# Patient Record
Sex: MALE | Race: WHITE | NOT HISPANIC OR LATINO | Employment: FULL TIME | URBAN - METROPOLITAN AREA
[De-identification: names, ages, dates, MRNs, and addresses within clinical notes are randomized per-mention and may not be internally consistent; named-entity substitution may affect disease eponyms.]

---

## 2024-07-02 ENCOUNTER — EVALUATION (OUTPATIENT)
Dept: PHYSICAL THERAPY | Facility: CLINIC | Age: 24
End: 2024-07-02
Payer: COMMERCIAL

## 2024-07-02 DIAGNOSIS — M25.511 CHRONIC RIGHT SHOULDER PAIN: Primary | ICD-10-CM

## 2024-07-02 DIAGNOSIS — M75.41 IMPINGEMENT SYNDROME OF RIGHT SHOULDER: ICD-10-CM

## 2024-07-02 DIAGNOSIS — G89.29 CHRONIC RIGHT SHOULDER PAIN: Primary | ICD-10-CM

## 2024-07-02 PROCEDURE — 97161 PT EVAL LOW COMPLEX 20 MIN: CPT

## 2024-07-08 ENCOUNTER — OFFICE VISIT (OUTPATIENT)
Dept: PHYSICAL THERAPY | Facility: CLINIC | Age: 24
End: 2024-07-08
Payer: COMMERCIAL

## 2024-07-08 DIAGNOSIS — G89.29 CHRONIC RIGHT SHOULDER PAIN: Primary | ICD-10-CM

## 2024-07-08 DIAGNOSIS — M25.511 CHRONIC RIGHT SHOULDER PAIN: Primary | ICD-10-CM

## 2024-07-08 DIAGNOSIS — M75.41 IMPINGEMENT SYNDROME OF RIGHT SHOULDER: ICD-10-CM

## 2024-07-08 PROCEDURE — 97112 NEUROMUSCULAR REEDUCATION: CPT | Performed by: PHYSICAL THERAPIST

## 2024-07-08 PROCEDURE — 97110 THERAPEUTIC EXERCISES: CPT | Performed by: PHYSICAL THERAPIST

## 2024-07-08 NOTE — PROGRESS NOTES
"Daily Note     Today's date: 2024  Patient name: Rolf Heard  : 2000  MRN: [id number]  Referring provider: Shira Evans CR*  Dx:   Encounter Diagnosis     ICD-10-CM    1. Chronic right shoulder pain  M25.511     G89.29       2. Impingement syndrome of right shoulder  M75.41           Start Time: 1755  Stop Time: 1836  Total time in clinic (min): 41 minutes    Subjective: Client arrives with reports on trying to get his HEP done a couple times over the holiday weekend. He also feels a \"pulling\" at times anterior to R shoulder.       Objective: See treatment diary below      Assessment: Client with good participation throughout session. He requires min cues for reducing compensatory patterns with upper trap and torso rotation during stabilizing exercises. Good carryover throughout. HEP updated and provided with client. All questions addressed at this time.       Plan: Continue per plan of care.       Insurance:  A/CMS Eval/ Re-eval Auth #/ Referral # Total units or visits Start date  Expiration date KX? Visit limitation?  PT only or  PT+OT? Co-Insurance   CMS 24 [id number]  7/2/24 10/2/24                     POC Start Date POC Expiration Date Signed POC?   24 pending     #[id number] Date              Visits/Units: 12 Used 1 1             Authed: 2024-10/2/2024  Remaining  11 10                  Precautions: None  HEP: Access Code [email or web address]    Date       Visit Number    2 1 (IE)   Manuals                                        Neuro Re-Ed         Scap retr    2x10    Supine horiz abd    RTB 2x10    Supine GH abc    2x    Seated no money    RTB 2x15    Supine diagonals    RTB 1x15 ea    I, T, Y    10x each (+) cues    Serratus wall slide    Complete next session                    Ther Ex        Sleeper str    10x, 10 sec hold    Doorway pec str    3x30 sec                                                    Ther Activity                        Gait Training           "              Modalities

## 2024-07-17 ENCOUNTER — OFFICE VISIT (OUTPATIENT)
Dept: PHYSICAL THERAPY | Facility: CLINIC | Age: 24
End: 2024-07-17
Payer: COMMERCIAL

## 2024-07-17 DIAGNOSIS — G89.29 CHRONIC RIGHT SHOULDER PAIN: Primary | ICD-10-CM

## 2024-07-17 DIAGNOSIS — M75.41 IMPINGEMENT SYNDROME OF RIGHT SHOULDER: ICD-10-CM

## 2024-07-17 DIAGNOSIS — M25.511 CHRONIC RIGHT SHOULDER PAIN: Primary | ICD-10-CM

## 2024-07-17 PROCEDURE — 97112 NEUROMUSCULAR REEDUCATION: CPT | Performed by: PHYSICAL THERAPIST

## 2024-07-17 NOTE — PROGRESS NOTES
Daily Note     Today's date: 2024  Patient name: Rolf Heard  : 2000  MRN: [id number]  Referring provider: Shira Evans CR*  Dx:   Encounter Diagnosis     ICD-10-CM    1. Chronic right shoulder pain  M25.511     G89.29       2. Impingement syndrome of right shoulder  M75.41           Start Time: 1749  Stop Time: 1840  Total time in clinic (min): 51 minutes    Subjective: Client arrives with reports on having some low back pain at times.       Objective: See treatment diary below      Assessment: Treatment focus on stabilization and strengthening. He continues to require min cues for appropriate alignment and cues. Reduction of low back pain during session. Continue to reinforce form. Initiated and trained on scapular clocks with yellow tband.       Plan: Continue per plan of care.       Insurance:  A/CMS Eval/ Re-eval Auth #/ Referral # Total units or visits Start date  Expiration date KX? Visit limitation?  PT only or  PT+OT? Co-Insurance   CMS 24 [id number]  7/2/24 10/2/24                     POC Start Date POC Expiration Date Signed POC?   24 pending     #[id number] Date             Visits/Units: 12 Used 1 1 1            Authed: 2024-10/2/2024  Remaining  11 10 9                 Precautions: None  HEP: Access Code [email or web address]    Date      Visit Number   3 2 1 (IE)   Manuals                                        Neuro Re-Ed         Scap retr   2x10 3-5 sec 2x10    Supine horiz abd   RTB 2x10 RTB 2x10    Supine GH abc   At 90 x 2 and 110 x 2 2x    Seated no money   Supine RTB 2x15 RTB 2x15    Supine diagonals   RTB 2x10 bilaterally RTB 1x15 ea    I, T, Y   10x each (+) cues 10x each (+) cues    Serratus wall slide   Serratus clock YTB 20x Complete next session                    Ther Ex        Sleeper str    10x, 10 sec hold    Doorway pec str   3x30 sec 3x30 sec                                                    Ther Activity                         Gait Training                        Modalities

## 2024-07-23 ENCOUNTER — OFFICE VISIT (OUTPATIENT)
Dept: PHYSICAL THERAPY | Facility: CLINIC | Age: 24
End: 2024-07-23
Payer: COMMERCIAL

## 2024-07-23 DIAGNOSIS — G89.29 CHRONIC RIGHT SHOULDER PAIN: Primary | ICD-10-CM

## 2024-07-23 DIAGNOSIS — M25.511 CHRONIC RIGHT SHOULDER PAIN: Primary | ICD-10-CM

## 2024-07-23 DIAGNOSIS — M75.41 IMPINGEMENT SYNDROME OF RIGHT SHOULDER: ICD-10-CM

## 2024-07-23 PROCEDURE — 97112 NEUROMUSCULAR REEDUCATION: CPT

## 2024-07-23 NOTE — PROGRESS NOTES
Daily Note     Today's date: 2024  Patient name: Rolf Heard  : 2000  MRN: [id number]  Referring provider: Shira Evans CR*  Dx:   Encounter Diagnosis     ICD-10-CM    1. Chronic right shoulder pain  M25.511     G89.29       2. Impingement syndrome of right shoulder  M75.41           Start Time: 1750  Stop Time: 1836  Total time in clinic (min): 46 minutes    Subjective: Pt reports that his shoulder feels about the same overall so far. Pt had an increase in R shoulder pain after doing a chest workout, currently still feels that discomfort in his anterior R shoulder.       Objective: See treatment diary below      Assessment: Tolerated treatment well. Patient demonstrated fatigue post treatment, exhibited good technique with therapeutic exercises, and would benefit from continued PT. Pt demonstrates less lumbar extension compensations with his AROM exercises, however he continues to get anterior R shoulder pain around his biceps tendon with any overhead work. Introduced more proximal biceps stretches with cane extension and BTB IR stretch with a towel. Continued to emphasize RTC engagement and good posture to help eliminate biceps recruitment and possible impingement. HEP was updated in response to his changes today.       Plan: Continue per plan of care.  Progress treatment as tolerated.        Insurance:  AMA/CMS Eval/ Re-eval Auth #/ Referral # Total units or visits Start date  Expiration date KX? Visit limitation?  PT only or  PT+OT? Co-Insurance   CMS 24 [id number]  7/2/24 10/2/24                     POC Start Date POC Expiration Date Signed POC?   24 pending     #[id number] Date            Visits/Units: 12 Used 1 2 3 4           Authed: 2024-10/2/2024  Remaining  11 10 9 8                Precautions: None  HEP: Access Code [email or web address]    Date     Visit Number  4 3 2 1 (IE)   Manuals                                        Neuro Re-Ed          Scap retr  Rows & extensions black 2x10 ea 2x10 3-5 sec 2x10    Supine horiz abd   RTB 2x10 RTB 2x10    Supine GH abc  Standing YTB 1x ea direction At 90 x 2 and 110 x 2 2x    Seated no money   Supine RTB 2x15 RTB 2x15    Supine diagonals   RTB 2x10 bilaterally RTB 1x15 ea    I, T, Y   10x each (+) cues 10x each (+) cues    Serratus wall slide  Serratus flexion YTB 1x15 Serratus clock YTB 20x Complete next session    Wall walks  YTB lateral 3 laps              Ther Ex        Sleeper str    10x, 10 sec hold    Doorway pec str   3x30 sec 3x30 sec    Standing cane ext PROM  Biceps str 2x10, 5s hold      IR BTB str w/towel  2x10, 5s hold                                      Ther Activity                        Gait Training                        Modalities

## 2024-08-01 ENCOUNTER — APPOINTMENT (OUTPATIENT)
Dept: PHYSICAL THERAPY | Facility: CLINIC | Age: 24
End: 2024-08-01
Payer: COMMERCIAL

## 2024-08-06 ENCOUNTER — OFFICE VISIT (OUTPATIENT)
Dept: PHYSICAL THERAPY | Facility: CLINIC | Age: 24
End: 2024-08-06
Payer: COMMERCIAL

## 2024-08-06 DIAGNOSIS — M75.41 IMPINGEMENT SYNDROME OF RIGHT SHOULDER: ICD-10-CM

## 2024-08-06 DIAGNOSIS — G89.29 CHRONIC RIGHT SHOULDER PAIN: Primary | ICD-10-CM

## 2024-08-06 DIAGNOSIS — M25.511 CHRONIC RIGHT SHOULDER PAIN: Primary | ICD-10-CM

## 2024-08-06 PROCEDURE — 97112 NEUROMUSCULAR REEDUCATION: CPT

## 2024-08-06 NOTE — PROGRESS NOTES
Daily Note     Today's date: 2024  Patient name: Rolf Heard  : 2000  MRN: [id number]  Referring provider: Shira Evans CR*  Dx:   Encounter Diagnosis     ICD-10-CM    1. Chronic right shoulder pain  M25.511     G89.29       2. Impingement syndrome of right shoulder  M75.41           Start Time: 1750  Stop Time: 1830  Total time in clinic (min): 40 minutes    Subjective: Pt reports no significant changes since his last treatment session. Pt feels potentially slightly decreased pain with OH movements, however still feels a pinching sensation. Pt denies pain at rest, no new complaints.       Objective: See treatment diary below      Assessment: Tolerated treatment well. Patient demonstrated fatigue post treatment, exhibited good technique with therapeutic exercises, and would benefit from continued PT      Plan: Continue per plan of care.  Progress treatment as tolerated.        Insurance:  [place]/CMS Eval/ Re-eval Auth #/ Referral # Total units or visits Start date  Expiration date KX? Visit limitation?  PT only or  PT+OT? Co-Insurance   CMS 24 [id number]  7/2/24 10/2/24                     POC Start Date POC Expiration Date Signed POC?   24 pending     #[id number] Date           Visits/Units: 12 Used 1 2 3 4 5          Authed: 2024-10/2/2024  Remaining  11 10 9 8 7               Precautions: None  HEP: Access Code [email or web address]    Date    Visit Number 5 4 3 2 1 (IE)   Manuals                                        Neuro Re-Ed         Scap retr  Rows & extensions black 2x10 ea 2x10 3-5 sec 2x10    Supine horiz abd   RTB 2x10 RTB 2x10    Supine GH abc  Standing YTB 1x ea direction At 90 x 2 and 110 x 2 2x    Seated no money   Supine RTB 2x15 RTB 2x15    Supine diagonals   RTB 2x10 bilaterally RTB 1x15 ea    I, T, Y   10x each (+) cues 10x each (+) cues    bodyblade Neutral 2x30s  45 ER 1x30s  90 flex 1x30s       Serratus wall slide Serratus  wobbles 2x10 Serratus flexion YTB 1x15 Serratus clock YTB 20x Complete next session    Wall walks YTB lateral 3 laps    YTB vertical 10 laps YTB lateral 3 laps              Ther Ex        Sleeper str    10x, 10 sec hold    Doorway pec str   3x30 sec 3x30 sec    Standing cane ext PROM Biceps str 3x10, 5s hold Biceps str 2x10, 5s hold      IR BTB str w/towel 2x10, 5s hold 2x10, 5s hold                                      Ther Activity                        Gait Training                        Modalities

## 2024-08-13 ENCOUNTER — EVALUATION (OUTPATIENT)
Dept: PHYSICAL THERAPY | Facility: CLINIC | Age: 24
End: 2024-08-13
Payer: COMMERCIAL

## 2024-08-13 DIAGNOSIS — M25.511 CHRONIC RIGHT SHOULDER PAIN: Primary | ICD-10-CM

## 2024-08-13 DIAGNOSIS — G89.29 CHRONIC RIGHT SHOULDER PAIN: Primary | ICD-10-CM

## 2024-08-13 DIAGNOSIS — M75.41 IMPINGEMENT SYNDROME OF RIGHT SHOULDER: ICD-10-CM

## 2024-08-13 PROCEDURE — 97110 THERAPEUTIC EXERCISES: CPT

## 2024-08-13 NOTE — LETTER
2024    VINICIUS Jc  [address] [facility] [address]    Patient: Rolf Heard   YOB: 2000   Date of Visit: 2024     Encounter Diagnosis     ICD-10-CM    1. Chronic right shoulder pain  M25.511     G89.29       2. Impingement syndrome of right shoulder  M75.41           Dear Dr. Evans:    Thank you for your recent referral of Rolf Heard. Please review the attached evaluation summary from Rolf's recent visit.     Please verify that you agree with the plan of care by signing the attached order.     If you have any questions or concerns, please do not hesitate to call.     I sincerely appreciate the opportunity to share in the care of one of your patients and hope to have another opportunity to work with you in the near future.       Sincerely,    Slim Cohn, PT      Referring Provider:      I certify that I have read the below Plan of Care and certify the need for these services furnished under this plan of treatment while under my care.                    VINICIUS Jc  [address] [place] [address]  Via Fax: [phone]          PT Re-Evaluation     Today's date: 2024  Patient name: Rolf Heard  : 2000  MRN: [id number]  Referring provider: Shira Evans CR*  Dx:   Encounter Diagnosis     ICD-10-CM    1. Chronic right shoulder pain  M25.511     G89.29       2. Impingement syndrome of right shoulder  M75.41             Start Time: 1752  Stop Time: 1830  Total time in clinic (min): 38 minutes    Assessment  Impairments: abnormal or restricted ROM, abnormal movement, activity intolerance, pain with function, poor body mechanics, participation limitations and activity limitations  Symptom irritability: low    Assessment details: Pt is a 24 y.o male who presents to skilled physical therapy for his 6th visit with complaints of chronic R shoulder pain. Pt demonstrated fair sitting posture, slightly increased R shoulder A/PROM with  less pain, slightly increased shoulder strength, continued TTP along proximal biceps and bicipital groove, and slightly improved scapular mechanics. Pt has made slow but steady gains over the past several weeks with the above listed deficits that have enabled him to return to more ADLs with decreased pain, however he has not returned to all ADL's and recreational activities without pain or compensations. Pt was re-educated on his diagnosis, prognosis, POC, and HEP. Pt would continue to benefit from skilled physical therapy to reduce his pain, address his deficits, progress towards his goals, and return to his PLOF.    Understanding of Dx/Px/POC: good     Prognosis: good    Goals  Short Term Goals:  1) Pt will initiate and progress his HEP in 2-3 weeks.- Met  2) Pt will improve his shoulder AROM to WNL without pain or compensations in 2-3 weeks.- Met  3) Pt will improve his shoulder MMT by 1 to improve his ADLs in 2-3 weeks.- Met    Long Term Goals:  1) Pt will be able to reach overhead without pain to grab a plate in 4-6 weeks.- Met  2) Pt will be able to perform ADLs without shoulder pain in 4-6 weeks.- Progressing  3) Pt will be able to workout for at least 30 mins with less than 2/10 pain in 4-6 weeks. -Progressing    Plan  Patient would benefit from: skilled physical therapy and PT eval  Planned modality interventions: cryotherapy    Planned therapy interventions: activity modification, ADL retraining, joint mobilization, manual therapy, motor coordination training, body mechanics training, neuromuscular re-education, coordination, postural training, patient/caregiver education, strengthening, stretching, therapeutic activities, therapeutic exercise, flexibility, functional ROM exercises, graded exercise and home exercise program    Frequency: 2x week  Duration in weeks: 6  Plan of Care beginning date: 8/13/2024  Plan of Care expiration date: 9/24/2024  Treatment plan discussed with: patient        Subjective  Evaluation    History of Present Illness  Mechanism of injury: 24 Update:  Pt states that he may be getting a little better. He has been taking it lighter at the gym which may have helped. Pt doesn't feel it as much with the dead hangs he would do. He still gets pain in the front of the shoulder when he does some of the PT exercises or when he does push-ups/chest presses.  He gets pain less frequently with the push-ups, he also isn't getting the intense pain the next morning when he is reaching overhead. Sleeping on his side he still might get a twinge but otherwise feels good. Pt doesn't have any follow-ups with his referring provider at this time. Pt feels that he is around 85% improved.   Patient Goals  Patient goals for therapy: decreased pain, increased motion, independence with ADLs/IADLs, return to sport/leisure activities and increased strength  Patient goal: working out without pain  Pain  Current pain ratin  At best pain ratin  At worst pain ratin  Location: anterior shoulder, biceps tendon/groove  Quality: sharp and pulling  Alleviating factors: stretches, exercises.  Aggravating factors: overhead activity and lifting (chest press)  Progression: improved    Social Support  Lives with: alone    Employment status: working  Hand dominance: right  Exercise history: working out at the gym          Objective     Static Posture     Head  Forward.    Shoulders  Rounded.    Postural Observations  Seated posture: fair  Standing posture: fair      Tenderness     Right Shoulder  Tenderness in the biceps tendon (proximal) and bicipital groove. No tenderness in the AC joint and acromion.     Active Range of Motion   Left Shoulder   Flexion: 165 (pulling) degrees   Abduction: 175 degrees   External rotation BTH: T4   Internal rotation BTB: T6     Right Shoulder   Flexion: 165 (pulling) degrees   Abduction: 170 degrees   External rotation BTH: T4 (squeeze)   Internal rotation BTB: T6 (discomfort)      Passive Range of Motion     Right Shoulder   Flexion: WFL  Abduction: WFL  External rotation 90°: WFL  Internal rotation 90°: 60 degrees with pain    Strength/Myotome Testing     Left Shoulder     Planes of Motion   Flexion: 5   Abduction: 4+   External rotation at 0°: 4+   Internal rotation at 0°: 4+     Right Shoulder     Planes of Motion   Flexion: 5   Abduction: 4+   External rotation at 0°: 4+   Internal rotation at 0°: 4+     Left Elbow   Flexion: 5  Extension: 5    Right Elbow   Flexion: 5  Extension: 5    Tests     Right Shoulder   Positive Hawkin's and lift-off.   Negative empty can, full can, horn blower, painful arc, Speed's and bicep load test positive.                   Insurance:  AMA/CMS Eval/ Re-eval Auth #/ Referral # Total units or visits Start date  Expiration date KX? Visit limitation?  PT only or  PT+OT? Co-Insurance   CMS 7/2/24 [id number]  7/2/24 10/2/24       CMS 8/13/24             POC Start Date POC Expiration Date Signed POC?   7/2/24 8/13/24 pending   8/13/24 9/24/24 pending     #[id number] Date 7/2 7/8 7/17 7/23 8/6 8/13         Visits/Units: 12 Used 1 2 3 4 5 6         Authed: 7/2/2024-10/2/2024  Remaining  11 10 9 8 7 6           Precautions: None  HEP: Access Code [email or web address]    Date 8/13 8/6 7/23 7/17 7/8   Visit Number 6 5 4 3 2   Manuals                                        Neuro Re-Ed         Scap retr   Rows & extensions black 2x10 ea 2x10 3-5 sec 2x10   Supine horiz abd    RTB 2x10 RTB 2x10   Supine GH abc   Standing YTB 1x ea direction At 90 x 2 and 110 x 2 2x   Seated no money    Supine RTB 2x15 RTB 2x15   Supine diagonals    RTB 2x10 bilaterally RTB 1x15 ea   I, T, Y    10x each (+) cues 10x each (+) cues   bodyblade  Neutral 2x30s  45 ER 1x30s  90 flex 1x30s      Serratus wall slide  Serratus wobbles 2x10 Serratus flexion YTB 1x15 Serratus clock YTB 20x Complete next session   Wall walks  YTB lateral 3 laps    YTB vertical 10 laps YTB lateral 3 laps             Ther Ex         Sleeper str     10x, 10 sec hold   Doorway pec str    3x30 sec 3x30 sec   Standing cane ext PROM  Biceps str 3x10, 5s hold Biceps str 2x10, 5s hold     IR BTB str w/towel  2x10, 5s hold 2x10, 5s hold                                     Ther Activity                        Gait Training                        Modalities

## 2024-08-13 NOTE — PROGRESS NOTES
PT Re-Evaluation     Today's date: 2024  Patient name: Rolf Heard  : 2000  MRN: [id number]  Referring provider: Shira Evans CR*  Dx:   Encounter Diagnosis     ICD-10-CM    1. Chronic right shoulder pain  M25.511     G89.29       2. Impingement syndrome of right shoulder  M75.41             Start Time: 1752  Stop Time: 1830  Total time in clinic (min): 38 minutes    Assessment  Impairments: abnormal or restricted ROM, abnormal movement, activity intolerance, pain with function, poor body mechanics, participation limitations and activity limitations  Symptom irritability: low    Assessment details: Pt is a 24 y.o male who presents to skilled physical therapy for his 6th visit with complaints of chronic R shoulder pain. Pt demonstrated fair sitting posture, slightly increased R shoulder A/PROM with less pain, slightly increased shoulder strength, continued TTP along proximal biceps and bicipital groove, and slightly improved scapular mechanics. Pt has made slow but steady gains over the past several weeks with the above listed deficits that have enabled him to return to more ADLs with decreased pain, however he has not returned to all ADL's and recreational activities without pain or compensations. Pt was re-educated on his diagnosis, prognosis, POC, and HEP. Pt would continue to benefit from skilled physical therapy to reduce his pain, address his deficits, progress towards his goals, and return to his PLOF.    Understanding of Dx/Px/POC: good     Prognosis: good    Goals  Short Term Goals:  1) Pt will initiate and progress his HEP in 2-3 weeks.- Met  2) Pt will improve his shoulder AROM to WNL without pain or compensations in 2-3 weeks.- Met  3) Pt will improve his shoulder MMT by 1 to improve his ADLs in 2-3 weeks.- Met    Long Term Goals:  1) Pt will be able to reach overhead without pain to grab a plate in 4-6 weeks.- Met  2) Pt will be able to perform ADLs without shoulder pain in 4-6  weeks.- Progressing  3) Pt will be able to workout for at least 30 mins with less than 2/10 pain in 4-6 weeks. -Progressing    Plan  Patient would benefit from: skilled physical therapy and PT eval  Planned modality interventions: cryotherapy    Planned therapy interventions: activity modification, ADL retraining, joint mobilization, manual therapy, motor coordination training, body mechanics training, neuromuscular re-education, coordination, postural training, patient/caregiver education, strengthening, stretching, therapeutic activities, therapeutic exercise, flexibility, functional ROM exercises, graded exercise and home exercise program    Frequency: 2x week  Duration in weeks: 6  Plan of Care beginning date: 2024  Plan of Care expiration date: 2024  Treatment plan discussed with: patient        Subjective Evaluation    History of Present Illness  Mechanism of injury: 24 Update:  Pt states that he may be getting a little better. He has been taking it lighter at the gym which may have helped. Pt doesn't feel it as much with the dead hangs he would do. He still gets pain in the front of the shoulder when he does some of the PT exercises or when he does push-ups/chest presses.  He gets pain less frequently with the push-ups, he also isn't getting the intense pain the next morning when he is reaching overhead. Sleeping on his side he still might get a twinge but otherwise feels good. Pt doesn't have any follow-ups with his referring provider at this time. Pt feels that he is around 85% improved.   Patient Goals  Patient goals for therapy: decreased pain, increased motion, independence with ADLs/IADLs, return to sport/leisure activities and increased strength  Patient goal: working out without pain  Pain  Current pain ratin  At best pain ratin  At worst pain ratin  Location: anterior shoulder, biceps tendon/groove  Quality: sharp and pulling  Alleviating factors: stretches,  exercises.  Aggravating factors: overhead activity and lifting (chest press)  Progression: improved    Social Support  Lives with: alone    Employment status: working  Hand dominance: right  Exercise history: working out at the gym          Objective     Static Posture     Head  Forward.    Shoulders  Rounded.    Postural Observations  Seated posture: fair  Standing posture: fair      Tenderness     Right Shoulder  Tenderness in the biceps tendon (proximal) and bicipital groove. No tenderness in the AC joint and acromion.     Active Range of Motion   Left Shoulder   Flexion: 165 (pulling) degrees   Abduction: 175 degrees   External rotation BTH: T4   Internal rotation BTB: T6     Right Shoulder   Flexion: 165 (pulling) degrees   Abduction: 170 degrees   External rotation BTH: T4 (squeeze)   Internal rotation BTB: T6 (discomfort)     Passive Range of Motion     Right Shoulder   Flexion: WFL  Abduction: WFL  External rotation 90°: WFL  Internal rotation 90°: 60 degrees with pain    Strength/Myotome Testing     Left Shoulder     Planes of Motion   Flexion: 5   Abduction: 4+   External rotation at 0°: 4+   Internal rotation at 0°: 4+     Right Shoulder     Planes of Motion   Flexion: 5   Abduction: 4+   External rotation at 0°: 4+   Internal rotation at 0°: 4+     Left Elbow   Flexion: 5  Extension: 5    Right Elbow   Flexion: 5  Extension: 5    Tests     Right Shoulder   Positive Hawkin's and lift-off.   Negative empty can, full can, horn blower, painful arc, Speed's and bicep load test positive.                   Insurance:  A/CMS Eval/ Re-eval Auth #/ Referral # Total units or visits Start date  Expiration date KX? Visit limitation?  PT only or  PT+OT? Co-Insurance   CMS 7/2/24 [id number]  7/2/24 10/2/24       CMS 8/13/24             POC Start Date POC Expiration Date Signed POC?   7/2/24 8/13/24 pending   8/13/24 9/24/24 pending     #[id number] Date 7/2 7/8 7/17 7/23 8/6 8/13         Visits/Units: 12 Used 1 2 3 4  5 6         Authed: 7/2/2024-10/2/2024  Remaining  11 10 9 8 7 6           Precautions: None  HEP: Access Code [email or web address]    Date 8/13 8/6 7/23 7/17 7/8   Visit Number 6 5 4 3 2   Manuals                                        Neuro Re-Ed         Scap retr   Rows & extensions black 2x10 ea 2x10 3-5 sec 2x10   Supine horiz abd    RTB 2x10 RTB 2x10   Supine GH abc   Standing YTB 1x ea direction At 90 x 2 and 110 x 2 2x   Seated no money    Supine RTB 2x15 RTB 2x15   Supine diagonals    RTB 2x10 bilaterally RTB 1x15 ea   I, T, Y    10x each (+) cues 10x each (+) cues   bodyblade  Neutral 2x30s  45 ER 1x30s  90 flex 1x30s      Serratus wall slide  Serratus wobbles 2x10 Serratus flexion YTB 1x15 Serratus clock YTB 20x Complete next session   Wall walks  YTB lateral 3 laps    YTB vertical 10 laps YTB lateral 3 laps             Ther Ex        Sleeper str     10x, 10 sec hold   Doorway pec str    3x30 sec 3x30 sec   Standing cane ext PROM  Biceps str 3x10, 5s hold Biceps str 2x10, 5s hold     IR BTB str w/towel  2x10, 5s hold 2x10, 5s hold                                     Ther Activity                        Gait Training                        Modalities

## 2024-08-20 ENCOUNTER — OFFICE VISIT (OUTPATIENT)
Dept: PHYSICAL THERAPY | Facility: CLINIC | Age: 24
End: 2024-08-20
Payer: COMMERCIAL

## 2024-08-20 DIAGNOSIS — M75.41 IMPINGEMENT SYNDROME OF RIGHT SHOULDER: ICD-10-CM

## 2024-08-20 DIAGNOSIS — G89.29 CHRONIC RIGHT SHOULDER PAIN: Primary | ICD-10-CM

## 2024-08-20 DIAGNOSIS — M25.511 CHRONIC RIGHT SHOULDER PAIN: Primary | ICD-10-CM

## 2024-08-20 PROCEDURE — 97112 NEUROMUSCULAR REEDUCATION: CPT

## 2024-08-20 NOTE — PROGRESS NOTES
"Daily Note     Today's date: 2024  Patient name: Rolf Heard  : 2000  MRN: [id number]  Referring provider: Shira Evans CR*  Dx:   Encounter Diagnosis     ICD-10-CM    1. Chronic right shoulder pain  M25.511     G89.29       2. Impingement syndrome of right shoulder  M75.41           Start Time: 1733  Stop Time: 1828  Total time in clinic (min): 55 minutes    Subjective: Pt reports potentially overdoing it  working out with his push-ups and workout, he was feeling more tension and discomfort in his biceps. Feels \"ok\" today prior to the start of his session.       Objective: See treatment diary below      Assessment: Tolerated treatment well. Patient demonstrated fatigue post treatment, exhibited good technique with therapeutic exercises, and would benefit from continued PT      Plan: Continue per plan of care.  Progress treatment as tolerated.        Insurance:  A/CMS Eval/ Re-eval Auth #/ Referral # Total units or visits Start date  Expiration date KX? Visit limitation?  PT only or  PT+OT? Co-Insurance   CMS 24 [id number]  7/2/24 10/2/24       CMS 24             POC Start Date POC Expiration Date Signed POC?   24 pending   24 pending     #[id number] Date          Visits/Units: 12 Used 1 2 3 4 5 6         Authed: 2024-10/2/2024  Remaining  11 10 9 8 7 6           Precautions: None  HEP: Access Code [email or web address]    Date    Visit Number 7 6 5 4 3 2   Manuals                                             Neuro Re-Ed          Scap retr    Rows & extensions black 2x10 ea 2x10 3-5 sec 2x10   Supine horiz abd     RTB 2x10 RTB 2x10   Supine GH abc    Standing YTB 1x ea direction At 90 x 2 and 110 x 2 2x   Seated no money     Supine RTB 2x15 RTB 2x15   Supine diagonals     RTB 2x10 bilaterally RTB 1x15 ea   I, T, Y     10x each (+) cues 10x each (+) cues   Bosu planks W/tennis ball 3x30s        bodyblade 90/90 3x30s  " Neutral 2x30s  45 ER 1x30s  90 flex 1x30s      Serratus wall slide   Serratus wobbles 2x10 Serratus flexion YTB 1x15 Serratus clock YTB 20x Complete next session   Wall walks YTB lateral 3 laps    YTB vertical 10 laps  YTB lateral 3 laps    YTB vertical 10 laps YTB lateral 3 laps     Standing clocks RTB 5x BUE        ER/IR walkouts GTB 10x ea        Ther Ex         Sleeper str      10x, 10 sec hold   Doorway pec str     3x30 sec 3x30 sec   Standing cane ext PROM Biceps str 2x10, 5s hold (2x)  Biceps str 3x10, 5s hold Biceps str 2x10, 5s hold     IR BTB str w/towel 3x10, 5s hold  2x10, 5s hold 2x10, 5s hold                                         Ther Activity                           Gait Training                           Modalities

## 2024-08-27 ENCOUNTER — OFFICE VISIT (OUTPATIENT)
Dept: PHYSICAL THERAPY | Facility: CLINIC | Age: 24
End: 2024-08-27
Payer: COMMERCIAL

## 2024-08-27 DIAGNOSIS — G89.29 CHRONIC RIGHT SHOULDER PAIN: Primary | ICD-10-CM

## 2024-08-27 DIAGNOSIS — M25.511 CHRONIC RIGHT SHOULDER PAIN: Primary | ICD-10-CM

## 2024-08-27 DIAGNOSIS — M75.41 IMPINGEMENT SYNDROME OF RIGHT SHOULDER: ICD-10-CM

## 2024-08-27 PROCEDURE — 97112 NEUROMUSCULAR REEDUCATION: CPT

## 2024-08-27 NOTE — PROGRESS NOTES
Daily Note     Today's date: 2024  Patient name: Rolf Heard  : 2000  MRN: [id number]  Referring provider: Shira Evans CR*  Dx:   Encounter Diagnosis     ICD-10-CM    1. Chronic right shoulder pain  M25.511     G89.29       2. Impingement syndrome of right shoulder  M75.41           Start Time: 1750  Stop Time: 1832  Total time in clinic (min): 42 minutes    Subjective: Pt reports no significant changes since his last treatment session. Pt continues with some anterior shoulder discomfort when doing chest presses, otherwise doesn't notice the pain with other ADLs. No pain prior to the start of his treatment session.       Objective: See treatment diary below      Assessment: Tolerated treatment well. Patient demonstrated fatigue post treatment, exhibited good technique with therapeutic exercises, and would benefit from continued PT      Plan: Continue per plan of care.  Progress treatment as tolerated.        Insurance:  AMA/CMS Eval/ Re-eval Auth #/ Referral # Total units or visits Start date  Expiration date KX? Visit limitation?  PT only or  PT+OT? Co-Insurance   CMS 24 [id number]  7/2/24 10/2/24       CMS 24             POC Start Date POC Expiration Date Signed POC?   24 pending   24 pending     #[id number] Date        Visits/Units: 12 Used 1 2 3 4 5 6 7 8       Authed: 2024-10/2/2024  Remaining  11 10 9 8 7 6 5 4         Precautions: None  HEP: Access Code [email or web address]    Date    Visit Number 8 7 6 5 4   Manuals                                        Neuro Re-Ed         Scap retr TRX rows w/eccentric LUIS ANTONIO lowering 10x ea    Rows & extensions black 2x10 ea   Supine horiz abd        Supine GH abc     Standing YTB 1x ea direction   Seated no money        Supine diagonals        I, T, Y        90/90 supported on table ER & IR 3x10 ER w/3lbs & eccentric lowering    3x10 IR w/GTB & eccentric return        Bosu planks  W/tennis ball 3x30s      bodyblade 90/90 4x30s 90/90 3x30s  Neutral 2x30s  45 ER 1x30s  90 flex 1x30s    Serratus wall slide    Serratus wobbles 2x10 Serratus flexion YTB 1x15   Wall walks  YTB lateral 3 laps    YTB vertical 10 laps  YTB lateral 3 laps    YTB vertical 10 laps YTB lateral 3 laps   Standing clocks  RTB 5x BUE      ER/IR walkouts GTB 10x ea GTB 10x ea      Ther Ex        Sleeper str        Doorway pec str        Standing cane ext PROM  Biceps str 2x10, 5s hold (2x)  Biceps str 3x10, 5s hold Biceps str 2x10, 5s hold   IR BTB str w/towel  3x10, 5s hold  2x10, 5s hold 2x10, 5s hold   Flys & reverse flys [place] 2.7 2x10 ea                               Ther Activity                        Gait Training                        Modalities

## 2024-09-05 ENCOUNTER — OFFICE VISIT (OUTPATIENT)
Dept: PHYSICAL THERAPY | Facility: CLINIC | Age: 24
End: 2024-09-05
Payer: COMMERCIAL

## 2024-09-05 DIAGNOSIS — G89.29 CHRONIC RIGHT SHOULDER PAIN: Primary | ICD-10-CM

## 2024-09-05 DIAGNOSIS — M75.41 IMPINGEMENT SYNDROME OF RIGHT SHOULDER: ICD-10-CM

## 2024-09-05 DIAGNOSIS — M25.511 CHRONIC RIGHT SHOULDER PAIN: Primary | ICD-10-CM

## 2024-09-05 PROCEDURE — 97112 NEUROMUSCULAR REEDUCATION: CPT

## 2024-09-05 PROCEDURE — 97110 THERAPEUTIC EXERCISES: CPT

## 2024-09-05 NOTE — PROGRESS NOTES
Daily Note     Today's date: 2024  Patient name: Rolf Heard  : 2000  MRN: [id number]  Referring provider: Shira Evans CR*  Dx:   Encounter Diagnosis     ICD-10-CM    1. Chronic right shoulder pain  M25.511     G89.29       2. Impingement syndrome of right shoulder  M75.41           Start Time: 1706  Stop Time: 1744  Total time in clinic (min): 38 minutes    Subjective: Pt reports that his shoulder has been fine recently, no significant changes. He may be feeling slightly better overall. Hasn't done any shoulder exercises at the gym lately.       Objective: See treatment diary below      Assessment: Tolerated treatment well. Patient demonstrated fatigue post treatment, exhibited good technique with therapeutic exercises, and would benefit from continued PT. Analyzed pt's shoulder press and chest press mechanics where he tends to go into excessive shoulder extension, causing increased anterior shoulder pain. When brought back to a more neutral position pt's pain was decreased.       Plan: Continue per plan of care.  Progress treatment as tolerated.        Insurance:  A/CMS Eval/ Re-eval Auth #/ Referral # Total units or visits Start date  Expiration date KX? Visit limitation?  PT only or  PT+OT? Co-Insurance   CMS 24 [id number]  7/2/24 10/2/24       CMS 24             POC Start Date POC Expiration Date Signed POC?   24 pending   24 pending     #[id number] Date  9      Visits/Units: 12 Used 1 2 3 4 5 6 7 8 9      Authed: 2024-10/2/2024  Remaining  11 10 9 8 7 6 5 4 3        Precautions: None  HEP: Access Code [email or web address]    Date    Visit Number 9 8 7 6 5   Manuals                                        Neuro Re-Ed         Scap retr TRX rows w/eccentric LUIS ANTONIO lowering 15x ea    TRX push-ups TRX rows w/eccentric LUIS ANTONIO lowering 10x ea      Supine horiz abd        Supine GH abc        Seated no money         Supine diagonals        I, T, Y        90/90 supported on table ER & IR  3x10 ER w/3lbs & eccentric lowering    3x10 IR w/GTB & eccentric return      Bosu planks   W/tennis ball 3x30s     bodyblade  90/90 4x30s 90/90 3x30s  Neutral 2x30s  45 ER 1x30s  90 flex 1x30s   Serratus wall slide     Serratus wobbles 2x10   Wall walks   YTB lateral 3 laps    YTB vertical 10 laps  YTB lateral 3 laps    YTB vertical 10 laps   Standing clocks   RTB 5x BUE     ER/IR walkouts  GTB 10x ea GTB 10x ea     Ther Ex        Sleeper str        Doorway pec str        Standing cane ext PROM   Biceps str 2x10, 5s hold (2x)  Biceps str 3x10, 5s hold   IR BTB str w/towel   3x10, 5s hold  2x10, 5s hold   Flys & reverse flys  [place] 2.7 2x10 ea      Lat pull down [place] 12.1 3x10       Inclined row [place] 12.1 3x10       OH carry 10lbs 8 laps 25ft    15lbs 2 laps 25ft stopped due ot fatigue       Ther Activity                        Gait Training                        Modalities

## 2024-09-10 ENCOUNTER — OFFICE VISIT (OUTPATIENT)
Dept: PHYSICAL THERAPY | Facility: CLINIC | Age: 24
End: 2024-09-10
Payer: COMMERCIAL

## 2024-09-10 DIAGNOSIS — M75.41 IMPINGEMENT SYNDROME OF RIGHT SHOULDER: ICD-10-CM

## 2024-09-10 DIAGNOSIS — M25.511 CHRONIC RIGHT SHOULDER PAIN: Primary | ICD-10-CM

## 2024-09-10 DIAGNOSIS — G89.29 CHRONIC RIGHT SHOULDER PAIN: Primary | ICD-10-CM

## 2024-09-10 PROCEDURE — 97112 NEUROMUSCULAR REEDUCATION: CPT

## 2024-09-10 NOTE — PROGRESS NOTES
Daily Note     Today's date: 9/10/2024  Patient name: Rolf Heard  : 2000  MRN: [id number]  Referring provider: Shira Evans CR*  Dx:   Encounter Diagnosis     ICD-10-CM    1. Chronic right shoulder pain  M25.511     G89.29       2. Impingement syndrome of right shoulder  M75.41           Start Time: 1709  Stop Time: 1745  Total time in clinic (min): 36 minutes    Subjective: Pt states overall his shoulder has been feeling good, he attempted push-ups while controlling how low he was going to not overextend his shoulder and didn't have any pain with it. He has yet to attempt bench press at the gym and will try to this week to see how his shoulder feels.       Objective: See treatment diary below      Assessment: Tolerated treatment well. Patient demonstrated fatigue post treatment, exhibited good technique with therapeutic exercises, and would benefit from continued PT      Plan: Continue per plan of care.  Progress treatment as tolerated.        Insurance:  A/CMS Eval/ Re-eval Auth #/ Referral # Total units or visits Start date  Expiration date KX? Visit limitation?  PT only or  PT+OT? Co-Insurance   CMS 24 [id number]  7/2/24 10/2/24       CMS 24             POC Start Date POC Expiration Date Signed POC?   24 pending   24 pending     #[id number] Date 7/2 7/8 7/17 7/23 8/6 8/13 8/20 8/27 9/5 9/10     Visits/Units: 12 Used 1 2 3 4 5 6 7 8 9 10     Authed: 2024-10/2/2024  Remaining  11 10 9 8 7 6 5 4 3 3       Precautions: None  HEP: Access Code [email or web address]    Date 9/10 9/4 8/27 8/20 8/13   Visit Number 10 9 8 7 6   Manuals                                        Neuro Re-Ed         Scap retr TRX rows w/eccentric LUIS ANTONIO lowering 30x ea (last set in 90 deg of abd)    TRX push-ups 4x10 (last set in 90 deg of abd) TRX rows w/eccentric LUIS ANTONIO lowering 15x ea    TRX push-ups TRX rows w/eccentric LUIS ANTONIO lowering 10x ea     Supine horiz abd        Supine GH abc        Seated no money         Supine diagonals        90/90 ER GTB 2x10 slow    Fast 6x to fatigue +5       I, T, Y        90/90 supported on table ER & IR   3x10 ER w/3lbs & eccentric lowering    3x10 IR w/GTB & eccentric return     Bosu planks    W/tennis ball 3x30s    bodyblade   90/90 4x30s 90/90 3x30s    Serratus wall slide        Wall walks    YTB lateral 3 laps    YTB vertical 10 laps    Standing clocks    RTB 5x BUE    ER/IR walkouts   GTB 10x ea GTB 10x ea    Ther Ex        Sleeper str        Doorway pec str        Standing cane ext PROM 1x10 biceps str w/cane   Biceps str 2x10, 5s hold (2x)    IR BTB str w/towel    3x10, 5s hold    Flys & reverse flys   [place] 2.7 2x10 ea     Lat pull down  [place] 12.1 3x10      Inclined row  [place] 12.1 3x10      OH carry 6 laps 20ft w/10lbs 10lbs 8 laps 25ft    15lbs 2 laps 25ft stopped due ot fatigue      Ther Activity                        Gait Training                        Modalities

## 2024-09-17 ENCOUNTER — OFFICE VISIT (OUTPATIENT)
Dept: PHYSICAL THERAPY | Facility: CLINIC | Age: 24
End: 2024-09-17
Payer: COMMERCIAL

## 2024-09-17 DIAGNOSIS — G89.29 CHRONIC RIGHT SHOULDER PAIN: Primary | ICD-10-CM

## 2024-09-17 DIAGNOSIS — M75.41 IMPINGEMENT SYNDROME OF RIGHT SHOULDER: ICD-10-CM

## 2024-09-17 DIAGNOSIS — M25.511 CHRONIC RIGHT SHOULDER PAIN: Primary | ICD-10-CM

## 2024-09-17 PROCEDURE — 97110 THERAPEUTIC EXERCISES: CPT

## 2024-09-17 PROCEDURE — 97112 NEUROMUSCULAR REEDUCATION: CPT

## 2024-09-17 NOTE — PROGRESS NOTES
Daily Note     Today's date: 2024  Patient name: Rolf Heard  : 2000  MRN: [id number]  Referring provider: Shira Evans CR*  Dx:   Encounter Diagnosis     ICD-10-CM    1. Chronic right shoulder pain  M25.511     G89.29       2. Impingement syndrome of right shoulder  M75.41           Start Time: 1751  Stop Time: 1830  Total time in clinic (min): 39 minutes    Subjective: Pt reports that he attempted performing bench press for the first time in awhile and he felt pretty good overall. He didn't go through the entire depth of the bench press which helped limit any increased biceps tendon irritation. No pain at rest prior to the start of his session.       Objective: See treatment diary below      Assessment: Tolerated treatment well. Patient demonstrated fatigue post treatment, exhibited good technique with therapeutic exercises, and would benefit from continued PT      Plan: Continue per plan of care.  Progress treatment as tolerated.        Insurance:  A/CMS Eval/ Re-eval Auth #/ Referral # Total units or visits Start date  Expiration date KX? Visit limitation?  PT only or  PT+OT? Co-Insurance   CMS 24 [id number]  7/2/24 10/2/24       CMS 24             POC Start Date POC Expiration Date Signed POC?   24 pending   24 pending     #[id number] Date 7/23 8/6 8/13 8/20 8/27 9/5 9/10 9/17    Visits/Units: 12 Used 4 5 6 7 8 9 10 11    Authed: 2024-10/2/2024  Remaining  8 7 6 5 4 3 2 1      Precautions: None  HEP: Access Code [email or web address]    Date 9/17 9/10 9/4 8/27   Visit Number 11 10 9 8   Manuals                                   Neuro Re-Ed        Scap retr TRX rows w/eccentric LUIS ANTONIO lowering 20x ea    TRX push-ups 4x10 (2 sets in 90 deg of abd) TRX rows w/eccentric LUIS ANTONIO lowering 30x ea (last set in 90 deg of abd)    TRX push-ups 4x10 (last set in 90 deg of abd) TRX rows w/eccentric LUIS ANTONIO lowering 15x ea    TRX push-ups TRX rows w/eccentric LUIS ANTONIO lowering 10x ea   Supine  horiz abd       Supine GH abc       Seated no money       Supine diagonals       90/90 ER YTB ER fast to fatigue +5  GTB ER fast to fatigue +5    GTB IR fast to fatigue +5 GTB 2x10 slow    Fast 6x to fatigue +5     I, T, Y       90/90 supported on table ER & IR    3x10 ER w/3lbs & eccentric lowering    3x10 IR w/GTB & eccentric return   Bosu planks       bodyblade Supinated @90 flex 4x30s   90/90 4x30s   Serratus wall slide       Wall walks       Standing clocks       ER/IR walkouts    GTB 10x ea   Ther Ex       Sleeper str       Doorway pec str       Standing cane ext PROM 2x10 biceps str w/cane 1x10 biceps str w/cane     IR BTB str w/towel       Flys & reverse flys    [place] 2.7 2x10 ea   Lat pull down   [place] 12.1 3x10    Inclined row   [place] 12.1 3x10    Reverse throws Half kneeling 3x10      OH throws green pball 2x30      OH carry  6 laps 20ft w/10lbs 10lbs 8 laps 25ft    15lbs 2 laps 25ft stopped due ot fatigue    Ther Activity                     Gait Training                     Modalities

## 2024-09-24 ENCOUNTER — EVALUATION (OUTPATIENT)
Dept: PHYSICAL THERAPY | Facility: CLINIC | Age: 24
End: 2024-09-24
Payer: COMMERCIAL

## 2024-09-24 DIAGNOSIS — M25.511 CHRONIC RIGHT SHOULDER PAIN: Primary | ICD-10-CM

## 2024-09-24 DIAGNOSIS — M75.41 IMPINGEMENT SYNDROME OF RIGHT SHOULDER: ICD-10-CM

## 2024-09-24 DIAGNOSIS — G89.29 CHRONIC RIGHT SHOULDER PAIN: Primary | ICD-10-CM

## 2024-09-24 PROCEDURE — 97140 MANUAL THERAPY 1/> REGIONS: CPT

## 2024-09-24 PROCEDURE — 97110 THERAPEUTIC EXERCISES: CPT

## 2024-09-24 NOTE — LETTER
2024    VINICIUS Jc  [address] [facility] [address]    Patient: Rolf Heard   YOB: 2000   Date of Visit: 2024     Encounter Diagnosis     ICD-10-CM    1. Chronic right shoulder pain  M25.511     G89.29       2. Impingement syndrome of right shoulder  M75.41           Dear Dr. Evans:    Thank you for your recent referral of Rolf Heard. Please review the attached evaluation summary from Rolf's recent visit.     Please verify that you agree with the plan of care by signing the attached order.     If you have any questions or concerns, please do not hesitate to call.     I sincerely appreciate the opportunity to share in the care of one of your patients and hope to have another opportunity to work with you in the near future.       Sincerely,    Slim Cohn, PT      Referring Provider:      I certify that I have read the below Plan of Care and certify the need for these services furnished under this plan of treatment while under my care.                    VINICIUS Jc  [address] [place] [id number]  Via Fax: [phone]          PT Re-Evaluation     Today's date: 2024  Patient name: Rolf Heard  : 2000  MRN: [id number]  Referring provider: Shira Evans CR*  Dx:   Encounter Diagnosis     ICD-10-CM    1. Chronic right shoulder pain  M25.511     G89.29       2. Impingement syndrome of right shoulder  M75.41             Start Time: 1746  Stop Time: 1824  Total time in clinic (min): 38 minutes    Assessment  Impairments: abnormal or restricted ROM, abnormal movement, activity intolerance, pain with function, poor body mechanics, participation limitations and activity limitations  Symptom irritability: low    Assessment details: Pt is a 24 y.o male who presents to skilled physical therapy for his 12th visit with complaints of chronic R shoulder pain. Pt maintained his R shoulder A/PROM with pain going into IR, slightly  increased shoulder strength, decreased TTP, and continually improving scapular mechanics. Pt continues with pain when lifting weights/working out so his thoracic spine was investigated. Pt showed maximal restriction into thoracic extension with hypomobility and pain in his upper thoracic spine. After performing PA mobilizations and instructing pt on performing thoracic extensions in seated, pt's shoulder AROM was re-assessed which showed slightly improved motion and abolished pain with less tension noted. Pt's thoracic spine appears to be a contributing factor to his shoulder pain. Pt's remaining pain and deficits are preventing him from performing recreational activities without compensations. Pt was re-educated on his diagnosis, prognosis, POC, and HEP. Pt would continue to benefit from skilled physical therapy to reduce his pain, address his deficits, progress towards his goals, and return to his PLOF.    Understanding of Dx/Px/POC: good     Prognosis: good    Goals  Short Term Goals:  1) Pt will initiate and progress his HEP in 2-3 weeks.- Met  2) Pt will improve his shoulder AROM to WNL without pain or compensations in 2-3 weeks.- Met  3) Pt will improve his shoulder MMT by 1 to improve his ADLs in 2-3 weeks.- Met    Long Term Goals:  1) Pt will be able to reach overhead without pain to grab a plate in 4-6 weeks.- Met  2) Pt will be able to perform ADLs without shoulder pain in 4-6 weeks.- Progressing  3) Pt will be able to workout for at least 30 mins with less than 2/10 pain in 4-6 weeks. -Progressing    Plan  Patient would benefit from: skilled physical therapy and PT eval  Planned modality interventions: cryotherapy    Planned therapy interventions: activity modification, ADL retraining, joint mobilization, manual therapy, motor coordination training, body mechanics training, neuromuscular re-education, coordination, postural training, patient/caregiver education, strengthening, stretching, therapeutic  activities, therapeutic exercise, flexibility, functional ROM exercises, graded exercise and home exercise program    Frequency: 1x week  Duration in weeks: 6  Plan of Care beginning date: 2024  Plan of Care expiration date: 2024  Treatment plan discussed with: patient        Subjective Evaluation    History of Present Illness  Mechanism of injury: 24 Update:  Pt states for the most part lifting in the gym for chest he gets minimal to no discomfort. Whenever he tries to do shoulder presses he gets pain more consistently. Shoulder presses he started with 20lbs with slight discomfort, when he bumped up the weight it was consistent with every rep. Pt denies any pain outside of working out, still gets some pain with push-ups. Reaching overhead has been fine no matter the time of day and sleeping on his sides is not a problem now. Dead hangs continue to be pain free. He continues to have some progress, still issues.   Patient Goals  Patient goals for therapy: decreased pain, increased motion, independence with ADLs/IADLs, return to sport/leisure activities and increased strength  Patient goal: working out without pain  Pain  Current pain ratin  At best pain ratin  At worst pain ratin  Location: anterior shoulder, biceps tendon/groove  Quality: sharp and pulling  Alleviating factors: stretches, exercises.  Aggravating factors: overhead activity and lifting (chest press)  Progression: improved    Social Support  Lives with: alone    Employment status: working  Hand dominance: right  Exercise history: working out at the gym          Objective     Static Posture     Head  Forward.    Shoulders  Rounded.    Postural Observations  Seated posture: fair  Standing posture: fair      Tenderness     Right Shoulder  Tenderness in the biceps tendon (proximal). No tenderness in the AC joint, acromion and bicipital groove.     Active Range of Motion   Cervical/Thoracic Spine       Thoracic    Flexion:   WFL  Extension: Active thoracic extension: tight.     Restriction level: maximal  Left Shoulder   Flexion: 165 (pulling) degrees   Abduction: 175 degrees   External rotation BTH: T4   Internal rotation BTB: T5     Right Shoulder   Flexion: 165 (pulling) degrees   Abduction: 170 degrees   External rotation BTH: T4 (pulling)   Internal rotation BTB: T6 (tight & pain)     Passive Range of Motion     Right Shoulder   Flexion: WFL  Abduction: WFL  External rotation 90°: WFL  Internal rotation 90°: 40 degrees with pain    Joint Play     Hypomobile: T1, T2, T3, T4, T5, T6, T7 and T8     Pain: T1, T2 and T3     Comments: After performing several PA mobilizations around T1-T4, retested pt's shoulder AROM with decreased pain/sensation of tightness    Strength/Myotome Testing     Left Shoulder     Planes of Motion   Flexion: 5   Abduction: 4+   External rotation at 0°: 5   Internal rotation at 0°: 4+     Right Shoulder     Planes of Motion   Flexion: 5   Abduction: 4+   External rotation at 0°: 4+   Internal rotation at 0°: 5     Left Elbow   Flexion: 5  Extension: 5    Right Elbow   Flexion: 5  Extension: 5    Tests     Right Shoulder   Positive Hawkin's.   Negative empty can, full can, horn blower, lift-off, painful arc, Speed's and bicep load test positive.                   Insurance:  AMA/CMS Eval/ Re-eval Auth #/ Referral # Total units or visits Start date  Expiration date KX? Visit limitation?  PT only or  PT+OT? Co-Insurance   CMS 7/2/24 [id number] 12 7/2/24 10/2/24       CMS 8/13/24           CMS 9/24/24             POC Start Date POC Expiration Date Signed POC?   7/2/24 8/13/24 pending   8/13/24 9/24/24 pending   9/24/24 11/5/24 pending     #[id number] Date 7/23 8/6 8/13 8/20 8/27 9/5 9/10 9/17 9/24   Visits/Units: 12 Used 4 5 6 7 8 9 10 11 12   Authed: 7/2/2024-10/2/2024  Remaining  8 7 6 5 4 3 2 1 0     Precautions: None  HEP: Access Code [email or web address]    Date 9/24 9/17 9/10 9/4 8/27   Visit Number 12 11 10 9 8    Manuals                                        Neuro Re-Ed         Scap retr  TRX rows w/eccentric LUIS ANTONIO lowering 20x ea    TRX push-ups 4x10 (2 sets in 90 deg of abd) TRX rows w/eccentric LUIS ANTONIO lowering 30x ea (last set in 90 deg of abd)    TRX push-ups 4x10 (last set in 90 deg of abd) TRX rows w/eccentric LUIS ANTONIO lowering 15x ea    TRX push-ups TRX rows w/eccentric LUIS ANTONIO lowering 10x ea   Supine horiz abd        Supine GH abc        Seated no money        Supine diagonals        90/90 ER  YTB ER fast to fatigue +5  GTB ER fast to fatigue +5    GTB IR fast to fatigue +5 GTB 2x10 slow    Fast 6x to fatigue +5     I, T, Y        90/90 supported on table ER & IR     3x10 ER w/3lbs & eccentric lowering    3x10 IR w/GTB & eccentric return   Bosu planks        bodyblade  Supinated @90 flex 4x30s   90/90 4x30s   Serratus wall slide        Wall walks        Standing clocks        ER/IR walkouts     GTB 10x ea   Ther Ex        Sleeper str        Doorway pec str        Standing cane ext PROM  2x10 biceps str w/cane 1x10 biceps str w/cane     IR BTB str w/towel        Flys & reverse flys     [place] 2.7 2x10 ea   Lat pull down    [place] 12.1 3x10    Inclined row    [place] 12.1 3x10    Reverse throws  Half kneeling 3x10      OH throws green pball  2x30      OH carry   6 laps 20ft w/10lbs 10lbs 8 laps 25ft    15lbs 2 laps 25ft stopped due ot fatigue    Ther Activity                        Gait Training                        Modalities

## 2024-09-24 NOTE — PROGRESS NOTES
PT Re-Evaluation     Today's date: 2024  Patient name: Rolf Heard  : 2000  MRN: [id number]  Referring provider: Shira Evans CR*  Dx:   Encounter Diagnosis     ICD-10-CM    1. Chronic right shoulder pain  M25.511     G89.29       2. Impingement syndrome of right shoulder  M75.41             Start Time: 1746  Stop Time:   Total time in clinic (min): 38 minutes    Assessment  Impairments: abnormal or restricted ROM, abnormal movement, activity intolerance, pain with function, poor body mechanics, participation limitations and activity limitations  Symptom irritability: low    Assessment details: Pt is a 24 y.o male who presents to skilled physical therapy for his 12th visit with complaints of chronic R shoulder pain. Pt maintained his R shoulder A/PROM with pain going into IR, slightly increased shoulder strength, decreased TTP, and continually improving scapular mechanics. Pt continues with pain when lifting weights/working out so his thoracic spine was investigated. Pt showed maximal restriction into thoracic extension with hypomobility and pain in his upper thoracic spine. After performing PA mobilizations and instructing pt on performing thoracic extensions in seated, pt's shoulder AROM was re-assessed which showed slightly improved motion and abolished pain with less tension noted. Pt's thoracic spine appears to be a contributing factor to his shoulder pain. Pt's remaining pain and deficits are preventing him from performing recreational activities without compensations. Pt was re-educated on his diagnosis, prognosis, POC, and HEP. Pt would continue to benefit from skilled physical therapy to reduce his pain, address his deficits, progress towards his goals, and return to his PLOF.    Understanding of Dx/Px/POC: good     Prognosis: good    Goals  Short Term Goals:  1) Pt will initiate and progress his HEP in 2-3 weeks.- Met  2) Pt will improve his shoulder AROM to WNL without pain or  compensations in 2-3 weeks.- Met  3) Pt will improve his shoulder MMT by 1 to improve his ADLs in 2-3 weeks.- Met    Long Term Goals:  1) Pt will be able to reach overhead without pain to grab a plate in 4-6 weeks.- Met  2) Pt will be able to perform ADLs without shoulder pain in 4-6 weeks.- Progressing  3) Pt will be able to workout for at least 30 mins with less than 2/10 pain in 4-6 weeks. -Progressing    Plan  Patient would benefit from: skilled physical therapy and PT eval  Planned modality interventions: cryotherapy    Planned therapy interventions: activity modification, ADL retraining, joint mobilization, manual therapy, motor coordination training, body mechanics training, neuromuscular re-education, coordination, postural training, patient/caregiver education, strengthening, stretching, therapeutic activities, therapeutic exercise, flexibility, functional ROM exercises, graded exercise and home exercise program    Frequency: 1x week  Duration in weeks: 6  Plan of Care beginning date: 9/24/2024  Plan of Care expiration date: 11/5/2024  Treatment plan discussed with: patient        Subjective Evaluation    History of Present Illness  Mechanism of injury: 9/24/24 Update:  Pt states for the most part lifting in the gym for chest he gets minimal to no discomfort. Whenever he tries to do shoulder presses he gets pain more consistently. Shoulder presses he started with 20lbs with slight discomfort, when he bumped up the weight it was consistent with every rep. Pt denies any pain outside of working out, still gets some pain with push-ups. Reaching overhead has been fine no matter the time of day and sleeping on his sides is not a problem now. Dead hangs continue to be pain free. He continues to have some progress, still issues.   Patient Goals  Patient goals for therapy: decreased pain, increased motion, independence with ADLs/IADLs, return to sport/leisure activities and increased strength  Patient goal: working  out without pain  Pain  Current pain ratin  At best pain ratin  At worst pain ratin  Location: anterior shoulder, biceps tendon/groove  Quality: sharp and pulling  Alleviating factors: stretches, exercises.  Aggravating factors: overhead activity and lifting (chest press)  Progression: improved    Social Support  Lives with: alone    Employment status: working  Hand dominance: right  Exercise history: working out at the gym          Objective     Static Posture     Head  Forward.    Shoulders  Rounded.    Postural Observations  Seated posture: fair  Standing posture: fair      Tenderness     Right Shoulder  Tenderness in the biceps tendon (proximal). No tenderness in the AC joint, acromion and bicipital groove.     Active Range of Motion   Cervical/Thoracic Spine       Thoracic    Flexion:  WFL  Extension: Active thoracic extension: tight.     Restriction level: maximal  Left Shoulder   Flexion: 165 (pulling) degrees   Abduction: 175 degrees   External rotation BTH: T4   Internal rotation BTB: T5     Right Shoulder   Flexion: 165 (pulling) degrees   Abduction: 170 degrees   External rotation BTH: T4 (pulling)   Internal rotation BTB: T6 (tight & pain)     Passive Range of Motion     Right Shoulder   Flexion: WFL  Abduction: WFL  External rotation 90°: WFL  Internal rotation 90°: 40 degrees with pain    Joint Play     Hypomobile: T1, T2, T3, T4, T5, T6, T7 and T8     Pain: T1, T2 and T3     Comments: After performing several PA mobilizations around T1-T4, retested pt's shoulder AROM with decreased pain/sensation of tightness    Strength/Myotome Testing     Left Shoulder     Planes of Motion   Flexion: 5   Abduction: 4+   External rotation at 0°: 5   Internal rotation at 0°: 4+     Right Shoulder     Planes of Motion   Flexion: 5   Abduction: 4+   External rotation at 0°: 4+   Internal rotation at 0°: 5     Left Elbow   Flexion: 5  Extension: 5    Right Elbow   Flexion: 5  Extension: 5    Tests     Right  Shoulder   Positive Hawkin's.   Negative empty can, full can, horn blower, lift-off, painful arc, Speed's and bicep load test positive.                   Insurance:  AMA/CMS Eval/ Re-eval Auth #/ Referral # Total units or visits Start date  Expiration date KX? Visit limitation?  PT only or  PT+OT? Co-Insurance   CMS 7/2/24 [id number] 12 7/2/24 10/2/24       CMS 8/13/24           CMS 9/24/24             POC Start Date POC Expiration Date Signed POC?   7/2/24 8/13/24 pending   8/13/24 9/24/24 pending   9/24/24 11/5/24 pending     #[id number] Date 7/23 8/6 8/13 8/20 8/27 9/5 9/10 9/17 9/24   Visits/Units: 12 Used 4 5 6 7 8 9 10 11 12   Authed: 7/2/2024-10/2/2024  Remaining  8 7 6 5 4 3 2 1 0     Precautions: None  HEP: Access Code [email or web address]    Date 9/24 9/17 9/10 9/4 8/27   Visit Number 12 11 10 9 8   Manuals                                        Neuro Re-Ed         Scap retr  TRX rows w/eccentric LUIS ANTONIO lowering 20x ea    TRX push-ups 4x10 (2 sets in 90 deg of abd) TRX rows w/eccentric LUIS ANTONIO lowering 30x ea (last set in 90 deg of abd)    TRX push-ups 4x10 (last set in 90 deg of abd) TRX rows w/eccentric LUIS ANTONIO lowering 15x ea    TRX push-ups TRX rows w/eccentric LUIS ANTONIO lowering 10x ea   Supine horiz abd        Supine GH abc        Seated no money        Supine diagonals        90/90 ER  YTB ER fast to fatigue +5  GTB ER fast to fatigue +5    GTB IR fast to fatigue +5 GTB 2x10 slow    Fast 6x to fatigue +5     I, T, Y        90/90 supported on table ER & IR     3x10 ER w/3lbs & eccentric lowering    3x10 IR w/GTB & eccentric return   Bosu planks        bodyblade  Supinated @90 flex 4x30s   90/90 4x30s   Serratus wall slide        Wall walks        Standing clocks        ER/IR walkouts     GTB 10x ea   Ther Ex        Sleeper str        Doorway pec str        Standing cane ext PROM  2x10 biceps str w/cane 1x10 biceps str w/cane     IR BTB str w/towel        Flys & reverse flys     [place] 2.7 2x10 ea   Lat pull down    [place] 12.1  3x10    Inclined row    [place] 12.1 3x10    Reverse throws  Half kneeling 3x10      OH throws green pball  2x30      OH carry   6 laps 20ft w/10lbs 10lbs 8 laps 25ft    15lbs 2 laps 25ft stopped due ot fatigue    Ther Activity                        Gait Training                        Modalities

## 2024-10-01 ENCOUNTER — OFFICE VISIT (OUTPATIENT)
Dept: PHYSICAL THERAPY | Facility: CLINIC | Age: 24
End: 2024-10-01
Payer: COMMERCIAL

## 2024-10-01 DIAGNOSIS — G89.29 CHRONIC RIGHT SHOULDER PAIN: Primary | ICD-10-CM

## 2024-10-01 DIAGNOSIS — M25.511 CHRONIC RIGHT SHOULDER PAIN: Primary | ICD-10-CM

## 2024-10-01 DIAGNOSIS — M75.41 IMPINGEMENT SYNDROME OF RIGHT SHOULDER: ICD-10-CM

## 2024-10-01 PROCEDURE — 97140 MANUAL THERAPY 1/> REGIONS: CPT

## 2024-10-01 PROCEDURE — 97110 THERAPEUTIC EXERCISES: CPT

## 2024-10-01 NOTE — PROGRESS NOTES
Daily Note     Today's date: 10/1/2024  Patient name: Rolf Heard  : 2000  MRN: [id number]  Referring provider: Shira Evans CR*  Dx:   Encounter Diagnosis     ICD-10-CM    1. Chronic right shoulder pain  M25.511     G89.29       2. Impingement syndrome of right shoulder  M75.41           Start Time: 1703  Stop Time: 1743  Total time in clinic (min): 40 minutes    Subjective: Pt states that his shoulder feels good at rest today, when he was working out the other day he felt the back mobility exercises helped reduce some of his shoulder pain during chest presses and other movements but he continues with shoulder press pain. No new complaints.       Objective: See treatment diary below      Assessment: Tolerated treatment well. Patient demonstrated fatigue post treatment, exhibited good technique with therapeutic exercises, and would benefit from continued PT. Introduced more thoracic mobility exercises with focus on extension and rotation as he continues to be extremely hypomobile in those directions.       Plan: Continue per plan of care.  Progress treatment as tolerated.        Insurance:  [place]/CMS Eval/ Re-eval Auth #/ Referral # Total units or visits Start date  Expiration date KX? Visit limitation?  PT only or  PT+OT? Co-Insurance   CMS 24 [id number] 12 7/2/24 10/2/24       CMS 24           CMS 24  12           POC Start Date POC Expiration Date Signed POC?   24 pending   24 pending   24 pending     #[id number] Date  8/6 8/13 8/20 8/27 9/5 9/10 9/17 9/24   Visits/Units: 12 Used 4 5 6 7 8 9 10 11 12   Authed: 2024-10/2/2024  Remaining  8 7 6 5 4 3 2 1 0     #[id number] Date 10/1      Visits/Units: 12 Used 1      Authed: 24- 24 Remaining  11        Precautions: None  HEP: Access Code [email or web address]    Date 9/24 9/17 9/10 9/   Visit Number 12 11 10 9 8   Manuals        T/s mobilizations Gr. III-IV                                Neuro Re-Ed         Scap retr TRX rows w/eccentric lowering LUIS ANTONIO 1x10 low, 1x10 high TRX rows w/eccentric LUIS ANTONIO lowering 20x ea    TRX push-ups 4x10 (2 sets in 90 deg of abd) TRX rows w/eccentric LUIS ANTONIO lowering 30x ea (last set in 90 deg of abd)    TRX push-ups 4x10 (last set in 90 deg of abd) TRX rows w/eccentric LUIS ANTONIO lowering 15x ea    TRX push-ups TRX rows w/eccentric LUIS ANTONIO lowering 10x ea   Supine horiz abd        Supine GH abc        Seated no money        Supine diagonals        90/90 ER  YTB ER fast to fatigue +5  GTB ER fast to fatigue +5    GTB IR fast to fatigue +5 GTB 2x10 slow    Fast 6x to fatigue +5     I, T, Y        90/90 supported on table ER & IR     3x10 ER w/3lbs & eccentric lowering    3x10 IR w/GTB & eccentric return   Bosu planks        bodyblade  Supinated @90 flex 4x30s   90/90 4x30s   Serratus wall slide        Wall walks        Standing clocks        ER/IR walkouts     GTB 10x ea   Ther Ex        Sleeper str        Doorway pec str        Cat-camel 2x10       T/s rot 2x10 B       T/s ext In sitting w/hip hinge 1x15    Over bolster 1x15       Thread the needle 1x15 B, 5s hold       Standing cane ext PROM  2x10 biceps str w/cane 1x10 biceps str w/cane     IR BTB str w/towel        Flys & reverse flys     [place] 2.7 2x10 ea   Lat pull down    [place] 12.1 3x10    Inclined row    [place] 12.1 3x10    Reverse throws Half kneeling 3x10 Half kneeling 3x10      OH throws green pball  2x30      OH carry   6 laps 20ft w/10lbs 10lbs 8 laps 25ft    15lbs 2 laps 25ft stopped due ot fatigue    Ther Activity                        Gait Training                        Modalities

## 2024-10-08 ENCOUNTER — OFFICE VISIT (OUTPATIENT)
Dept: PHYSICAL THERAPY | Facility: CLINIC | Age: 24
End: 2024-10-08
Payer: COMMERCIAL

## 2024-10-08 DIAGNOSIS — M75.41 IMPINGEMENT SYNDROME OF RIGHT SHOULDER: ICD-10-CM

## 2024-10-08 DIAGNOSIS — M25.511 CHRONIC RIGHT SHOULDER PAIN: Primary | ICD-10-CM

## 2024-10-08 DIAGNOSIS — G89.29 CHRONIC RIGHT SHOULDER PAIN: Primary | ICD-10-CM

## 2024-10-08 PROCEDURE — 97112 NEUROMUSCULAR REEDUCATION: CPT | Performed by: PHYSICAL THERAPIST

## 2024-10-08 PROCEDURE — 97110 THERAPEUTIC EXERCISES: CPT | Performed by: PHYSICAL THERAPIST

## 2024-10-08 NOTE — PROGRESS NOTES
Daily Note     Today's date: 10/8/2024  Patient name: Rolf Heard  : 2000  MRN: [id number]  Referring provider: Shira Evans CR*  Dx:   Encounter Diagnosis     ICD-10-CM    1. Chronic right shoulder pain  M25.511     G89.29       2. Impingement syndrome of right shoulder  M75.41                      Subjective: Rolf Heard reports his shoulder is doing well but can still be sore after doing overhead lifting or pressing.        Objective: See treatment diary below      Assessment: Tolerated treatment well. Patient with intermittent anterior shoulder pain with repeated overhead activity but was able to reduce following tactile cues to improve scapular positioning.        Plan: Continue per plan of care.       Insurance:  AMA/CMS Eval/ Re-eval Auth #/ Referral # Total units or visits Start date  Expiration date KX? Visit limitation?  PT only or  PT+OT? Co-Insurance   CMS 24 [id number] 12 7/2/24 10/2/24       CMS 24           CMS 24  12           POC Start Date POC Expiration Date Signed POC?   24 pending   24 pending   24 pending     #[id number] Date 7/23 8/6 8/13 8/20 8/27 9/5 9/10 9/17 9/24   Visits/Units: 12 Used 4 5 6 7 8 9 10 11 12   Authed: 2024-10/2/2024  Remaining  8 7 6 5 4 3 2 1 0     #[id number] Date 10/1 10/8     Visits/Units: 12 Used 1 2     Authed: 24- 24 Remaining  11 10       Precautions: None  HEP: Access Code [email or web address]    Date 10/8 10/1 9/24 9/17 9/10   Visit Number 14 13 12 11 10   Manuals        T/s mobilizations   Gr. III-IV                             Neuro Re-Ed         Scap retr   TRX rows w/eccentric lowering LUIS ANTONIO 1x10 low, 1x10 high TRX rows w/eccentric LUIS ANTONIO lowering 20x ea    TRX push-ups 4x10 (2 sets in 90 deg of abd) TRX rows w/eccentric LUIS ANTONIO lowering 30x ea (last set in 90 deg of abd)    TRX push-ups 4x10 (last set in 90 deg of abd)   Supine horiz abd        Supine GH abc        Seated no money        Supine  diagonals        90/90 ER    YTB ER fast to fatigue +5  GTB ER fast to fatigue +5    GTB IR fast to fatigue +5 GTB 2x10 slow    Fast 6x to fatigue +5   I, T, Y        90/90 supported on table ER & IR        Bosu planks        bodyblade D2 flex/ext   3 sets to fatigue - TCs for scap stab   Supinated @90 flex 4x30s    Serratus wall slide No wall YTB   4x5       Seated overhead ball taps Green PB  2x30       Standing clocks        ER/IR walkouts        Ther Ex        Sleeper str        Doorway pec str        Cat-camel   2x10     T/s rot 2x10 B  2x10 B     T/s ext Over bolster 1x15 +  15x PT ex mobs  In sitting w/hip hinge 1x15    Over bolster 1x15     Thread the needle 10x B 5s  1x15 B, 5s hold     Standing cane ext PROM    2x10 biceps str w/cane 1x10 biceps str w/cane   IR BTB str w/towel        Flys & reverse flys        Lat pull down        Inclined row        Reverse throws Half kneeling 30x  Half kneeling 3x10 Half kneeling 3x10    OH throws green pball    2x30    OH carry     6 laps 20ft w/10lbs   Ther Activity                        Gait Training                        Modalities

## 2024-10-14 ENCOUNTER — OFFICE VISIT (OUTPATIENT)
Dept: PHYSICAL THERAPY | Facility: CLINIC | Age: 24
End: 2024-10-14
Payer: COMMERCIAL

## 2024-10-14 DIAGNOSIS — M25.511 CHRONIC RIGHT SHOULDER PAIN: Primary | ICD-10-CM

## 2024-10-14 DIAGNOSIS — M75.41 IMPINGEMENT SYNDROME OF RIGHT SHOULDER: ICD-10-CM

## 2024-10-14 DIAGNOSIS — G89.29 CHRONIC RIGHT SHOULDER PAIN: Primary | ICD-10-CM

## 2024-10-14 PROCEDURE — 97112 NEUROMUSCULAR REEDUCATION: CPT

## 2024-10-14 PROCEDURE — 97110 THERAPEUTIC EXERCISES: CPT

## 2024-10-14 NOTE — PROGRESS NOTES
Daily Note     Today's date: 10/14/2024  Patient name: Rolf Heard  : 2000  MRN: [id number]  Referring provider: Shira Evans CR*  Dx:   Encounter Diagnosis     ICD-10-CM    1. Chronic right shoulder pain  M25.511     G89.29       2. Impingement syndrome of right shoulder  M75.41           Start Time: 1752  Stop Time: 1835  Total time in clinic (min): 43 minutes    Subjective: Pt states that his shoulder is doing fine. During his bench press if he goes all the way down he still gets some pulling in his pec and biceps but no pain, however still will get some discomfort during shoulder presses. Pt decreased the weight of his shoulder press and didn't notice any pain.       Objective: See treatment diary below      Assessment: Tolerated treatment well. Patient demonstrated fatigue post treatment, exhibited good technique with therapeutic exercises, and would benefit from continued PT      Plan: Continue per plan of care.  Progress treatment as tolerated.        Insurance:  A/CMS Eval/ Re-eval Auth #/ Referral # Total units or visits Start date  Expiration date KX? Visit limitation?  PT only or  PT+OT? Co-Insurance   CMS 24 [id number] 12 7/2/24 10/2/24       CMS 24           CMS 24  12           POC Start Date POC Expiration Date Signed POC?   24 pending   24 pending   24 pending     #[id number] Date 7/23 8/6 8/13 8/20 8/27 9/5 9/10 9/17 9/24   Visits/Units: 12 Used 4 5 6 7 8 9 10 11 12   Authed: 2024-10/2/2024  Remaining  8 7 6 5 4 3 2 1 0     #[id number] Date 10/1 10/8 10/14    Visits/Units: 12 Used 1 2 3    Authed: 24- 24 Remaining  11 10 9      Precautions: None  HEP: Access Code [email or web address]    Date 10/14 10/8 10/1 9/24 9/17 9/10   Visit Number 15 14 13 12 11 10   Manuals         T/s mobilizations    Gr. III-IV                                Neuro Re-Ed          Scap retr    TRX rows w/eccentric lowering LUIS ANTONIO 1x10 low, 1x10 high TRX rows  w/eccentric LUIS ANTONIO lowering 20x ea    TRX push-ups 4x10 (2 sets in 90 deg of abd) TRX rows w/eccentric LUIS ANTONIO lowering 30x ea (last set in 90 deg of abd)    TRX push-ups 4x10 (last set in 90 deg of abd)   Supine horiz abd         Supine GH abc         Seated no money         Supine diagonals         90/90 ER Black until fatigue +5    YTB ER fast to fatigue +5  GTB ER fast to fatigue +5    GTB IR fast to fatigue +5 GTB 2x10 slow    Fast 6x to fatigue +5   I, T, Y         90/90 supported on table ER & IR         Bosu planks         bodyblade D2 flex/ext 3 sets to fatigue D2 flex/ext   3 sets to fatigue - TCs for scap stab   Supinated @90 flex 4x30s    Serratus wall slide GTB serratus flexion 2x10 No wall YTB   4x5       Seated overhead ball taps Green PB 3x30 Green PB  2x30       blazepod Plank reach challenge 2x30s B        Standing clocks         ER/IR walkouts         Ther Ex         Sleeper str         Doorway pec str         Cat-camel    2x10     T/s rot 2x10 B 2x10 B  2x10 B     T/s ext Over bolster 2x10 Over bolster 1x15 +  15x PT ex mobs  In sitting w/hip hinge 1x15    Over bolster 1x15     Thread the needle  10x B 5s  1x15 B, 5s hold     Standing cane ext PROM     2x10 biceps str w/cane 1x10 biceps str w/cane   IR BTB str w/towel         Flys & reverse flys         Lat pull down         Inclined row         Reverse throws Forward throws into IR 30x Half kneeling 30x  Half kneeling 3x10 Half kneeling 3x10    OH throws green pball     2x30    OH carry      6 laps 20ft w/10lbs   Ther Activity                           Gait Training                           Modalities

## 2024-10-22 ENCOUNTER — OFFICE VISIT (OUTPATIENT)
Dept: PHYSICAL THERAPY | Facility: CLINIC | Age: 24
End: 2024-10-22
Payer: COMMERCIAL

## 2024-10-22 DIAGNOSIS — M25.511 CHRONIC RIGHT SHOULDER PAIN: Primary | ICD-10-CM

## 2024-10-22 DIAGNOSIS — M75.41 IMPINGEMENT SYNDROME OF RIGHT SHOULDER: ICD-10-CM

## 2024-10-22 DIAGNOSIS — G89.29 CHRONIC RIGHT SHOULDER PAIN: Primary | ICD-10-CM

## 2024-10-22 PROCEDURE — 97112 NEUROMUSCULAR REEDUCATION: CPT

## 2024-10-22 NOTE — PROGRESS NOTES
Daily Note     Today's date: 10/22/2024  Patient name: Rolf Heard  : 2000  MRN: [id number]  Referring provider: Shira Evans CR*  Dx:   Encounter Diagnosis     ICD-10-CM    1. Chronic right shoulder pain  M25.511     G89.29       2. Impingement syndrome of right shoulder  M75.41           Start Time: 1705  Stop Time: 1747  Total time in clinic (min): 42 minutes    Subjective: Pt states that performing bench press with the bar he hasn't noticed much pain, however when he uses free weights he does get increased R shoulder pain. Pt also notices continued shoulder pain during shoulder presses. Otherwise pt doesn't notice any pain with other ADLs.      Objective: See treatment diary below      Assessment: Tolerated treatment well. Patient demonstrated fatigue post treatment, exhibited good technique with therapeutic exercises, and would benefit from continued PT      Plan: Continue per plan of care.  Progress treatment as tolerated.        Insurance:  A/CMS Eval/ Re-eval Auth #/ Referral # Total units or visits Start date  Expiration date KX? Visit limitation?  PT only or  PT+OT? Co-Insurance   CMS 24 [id number] 12 7/2/24 10/2/24       CMS 24           CMS 24  12           POC Start Date POC Expiration Date Signed POC?   24 pending   24 pending   24 pending     #[id number] Date 7/23 8/6 8/13 8/20 8/27 9/5 9/10 9/17 9/24   Visits/Units: 12 Used 4 5 6 7 8 9 10 11 12   Authed: 2024-10/2/2024  Remaining  8 7 6 5 4 3 2 1 0     #[id number] Date 10/1 10/8 10/14 10/22   Visits/Units: 12 Used 1 2 3 4   Authed: 24- 24 Remaining  11 10 9 8     Precautions: None  HEP: Access Code [email or web address]    Date 10/22 10/14 10/8 10/1 9/24 9/17   Visit Number 16 15 14 13 12 11   Manuals         T/s mobilizations     Gr. III-IV                               Neuro Re-Ed          Scap retr TRX rows w/eccentric lowering LUIS ANTONIO 3x10    TRX rows w/eccentric lowering LUIS ANTONIO 1x10  low, 1x10 high TRX rows w/eccentric LUIS ANTONIO lowering 20x ea    TRX push-ups 4x10 (2 sets in 90 deg of abd)   Supine horiz abd         Supine GH abc         Seated no money         Supine diagonals         90/90 ER Black 4x10 B Black until fatigue +5    YTB ER fast to fatigue +5  GTB ER fast to fatigue +5    GTB IR fast to fatigue +5   I, T, Y         90/90 supported on table ER & IR         Bosu planks         bodyblade D2 flex/ext 3x10    Flexion 3x30s D2 flex/ext 3 sets to fatigue D2 flex/ext   3 sets to fatigue - TCs for scap stab   Supinated @90 flex 4x30s   Serratus wall slide GTB serratus flexion 3x10 GTB serratus flexion 2x10 No wall YTB   4x5      Seated overhead ball taps  Green PB 3x30 Green PB  2x30      blazepod  Plank reach challenge 2x30s B       Single arm push-ups @ table 1x10        Standing clocks         ER/IR walkouts         Ther Ex         Sleeper str         Doorway pec str         Cat-camel     2x10    T/s rot  2x10 B 2x10 B  2x10 B    T/s ext  Over bolster 2x10 Over bolster 1x15 +  15x PT ex mobs  In sitting w/hip hinge 1x15    Over bolster 1x15    Thread the needle   10x B 5s  1x15 B, 5s hold    Standing cane ext PROM      2x10 biceps str w/cane   IR BTB str w/towel         Flys & reverse flys         Lat pull down         Inclined row         Reverse throws  Forward throws into IR 30x Half kneeling 30x  Half kneeling 3x10 Half kneeling 3x10   OH throws green pball      2x30   OH carry         Ther Activity                           Gait Training                           Modalities                                                       Normal rate, regular rhythm.  Heart sounds S1, S2.  No murmurs, rubs or gallops.

## 2024-10-29 ENCOUNTER — OFFICE VISIT (OUTPATIENT)
Dept: PHYSICAL THERAPY | Facility: CLINIC | Age: 24
End: 2024-10-29
Payer: COMMERCIAL

## 2024-10-29 DIAGNOSIS — M75.41 IMPINGEMENT SYNDROME OF RIGHT SHOULDER: ICD-10-CM

## 2024-10-29 DIAGNOSIS — M25.511 CHRONIC RIGHT SHOULDER PAIN: Primary | ICD-10-CM

## 2024-10-29 DIAGNOSIS — G89.29 CHRONIC RIGHT SHOULDER PAIN: Primary | ICD-10-CM

## 2024-10-29 PROCEDURE — 97112 NEUROMUSCULAR REEDUCATION: CPT

## 2024-10-29 NOTE — PROGRESS NOTES
Daily Note     Today's date: 10/29/2024  Patient name: Rolf Heard  : 2000  MRN: [id number]  Referring provider: Shira Evans CR*  Dx:   Encounter Diagnosis     ICD-10-CM    1. Chronic right shoulder pain  M25.511     G89.29       2. Impingement syndrome of right shoulder  M75.41           Start Time: 1705  Stop Time: 1745  Total time in clinic (min): 40 minutes    Subjective: Pt reports that his shoulder continues to get a little better every week but he continues to have pain while performing shoulder presses. Pt is able to do a few reps before the pain/pinching commences. Pt states no pain at rest prior to the start of his session.       Objective: See treatment diary below      Assessment: Tolerated treatment well. Patient demonstrated fatigue post treatment, exhibited good technique with therapeutic exercises, and would benefit from continued PT. Pt continues to exhibit pinching of his anterior R shoulder during overhead strengthening. Pt is able to do a few reps at a time, break, and then resume without pinching but if he does too many in a row he is unable to perform without pain. Pt will take a 2 week break from PT to continue strengthening with his HEP. If no significant progress is seen pt will be referred to ortho specialist for further evaluation.       Plan: Continue per plan of care.  Progress treatment as tolerated.        Insurance:  AMA/CMS Eval/ Re-eval Auth #/ Referral # Total units or visits Start date  Expiration date KX? Visit limitation?  PT only or  PT+OT? Co-Insurance   CMS 24 [id number] 12 7/2/24 10/2/24       CMS 24           CMS 24  12           POC Start Date POC Expiration Date Signed POC?   24 pending   24 pending   24 pending     #[id number] Date 7/23 8/6 8/13 8/20 8/27 9/5 9/10 9/17 9/24   Visits/Units: 12 Used 4 5 6 7 8 9 10 11 12   Authed: 2024-10/2/2024  Remaining  8 7 6 5 4 3 2 1 0     #[id number] Date 10/1  10/8 10/14 10/22 10/29   Visits/Units: 12 Used 1 2 3 4 5   Authed: 9/27/24- 12/27/24 Remaining  11 10 9 8 7     Precautions: None  HEP: Access Code [email or web address]    Date 10/29 10/22 10/14 10/8 10/1 9/24   Visit Number 17 16 15 14 13 12   Manuals         T/s mobilizations      Gr. III-IV                              Neuro Re-Ed          Scap retr  TRX rows w/eccentric lowering LUIS ANTONIO 3x10    TRX rows w/eccentric lowering LUIS ANTONIO 1x10 low, 1x10 high   Supine horiz abd         Supine GH abc         Seated no money         Supine diagonals         90/90 ER Black 3x10 w/3s hold eccentrically to neutral    Black IR 3x10 w/3s hold eccentrically to neutral Black 4x10 B Black until fatigue +5      I, T, Y         90/90 supported on table ER & IR         Bosu planks         bodyblade Flexion 2x30s    90/90 2x30s D2 flex/ext 3x10    Flexion 3x30s D2 flex/ext 3 sets to fatigue D2 flex/ext   3 sets to fatigue - TCs for scap stab     Serratus wall slide GTB serratus flexion 2x10 GTB serratus flexion 3x10 GTB serratus flexion 2x10 No wall YTB   4x5     Seated overhead ball taps   Green PB 3x30 Green PB  2x30     blazepod   Plank reach challenge 2x30s B      Single arm push-ups BUE contact only using RUE to presses 1x10 @ table 1x10       Standing clocks         ER/IR walkouts         Ther Ex         Sleeper str         Doorway pec str         Cat-camel      2x10   T/s rot   2x10 B 2x10 B  2x10 B   T/s ext   Over bolster 2x10 Over bolster 1x15 +  15x PT ex mobs  In sitting w/hip hinge 1x15    Over bolster 1x15   Thread the needle    10x B 5s  1x15 B, 5s hold   Standing cane ext PROM         IR BTB str w/towel         Flys & reverse flys         Lat pull down         Inclined row         Reverse throws   Forward throws into IR 30x Half kneeling 30x  Half kneeling 3x10   OH throws green pball         OH carry         Ther Activity                           Gait Training                           Modalities

## 2024-11-19 ENCOUNTER — OFFICE VISIT (OUTPATIENT)
Dept: PHYSICAL THERAPY | Facility: CLINIC | Age: 24
End: 2024-11-19
Payer: COMMERCIAL

## 2024-11-19 DIAGNOSIS — M25.511 CHRONIC RIGHT SHOULDER PAIN: Primary | ICD-10-CM

## 2024-11-19 DIAGNOSIS — M75.41 IMPINGEMENT SYNDROME OF RIGHT SHOULDER: ICD-10-CM

## 2024-11-19 DIAGNOSIS — G89.29 CHRONIC RIGHT SHOULDER PAIN: Primary | ICD-10-CM

## 2024-11-19 PROCEDURE — 97110 THERAPEUTIC EXERCISES: CPT

## 2024-11-19 NOTE — LETTER
2024    VINICIUS Jc  [address] [facility] [address]    Patient: Rolf Heard   YOB: 2000   Date of Visit: 2024     Encounter Diagnosis     ICD-10-CM    1. Chronic right shoulder pain  M25.511     G89.29       2. Impingement syndrome of right shoulder  M75.41           Dear Dr. Evans:    Thank you for your recent referral of Rolf Haerd. Please review the attached evaluation summary from Rolf's recent visit.     Please verify that you agree with the plan of care by signing the attached order.     If you have any questions or concerns, please do not hesitate to call.     I sincerely appreciate the opportunity to share in the care of one of your patients and hope to have another opportunity to work with you in the near future.       Sincerely,    Slim Cohn, PT      Referring Provider:      I certify that I have read the below Plan of Care and certify the need for these services furnished under this plan of treatment while under my care.                    VINICIUS Jc  [address] [facility] [address]  Via Fax: [phone]          Discharge     Today's date: 2024  Patient name: Rolf Heard  : 2000  MRN: [id number]  Referring provider: Shira Evans CR*  Dx:   Encounter Diagnosis     ICD-10-CM    1. Chronic right shoulder pain  M25.511     G89.29       2. Impingement syndrome of right shoulder  M75.41                      Subjective: Pt reports that her hasn't noticed any more significant changes and has plateaued for the last month or so. Pt continues with pain overhead when lifting weights as well as during bench press. He feels he has made progress but doesn't feel like he is progressing with PT anymore. Pt is curious about next steps.      Objective: See treatment diary below      Assessment:  Pt has not made any significant progress with his shoulder pain over the last several weeks and continues with difficulty  during overhead activities and weight lifting. Pt's pain has decreased overall but is unable to be abolished lately. Due to pt's lack of progress recently and the level of his exercises, pt was advised to seek further evaluation and options from an orthopedic specialist while we put skilled PT on hold for now . Discussed with pt his options moving forward.      Plan:  Refer pt out to orthopedics for further evaluation. PT on hold for now.        Insurance:  AMA/CMS Eval/ Re-eval Auth #/ Referral # Total units or visits Start date  Expiration date KX? Visit limitation?  PT only or  PT+OT? Co-Insurance   CMS 7/2/24 [id number] 12 7/2/24 10/2/24       CMS 8/13/24           CMS 9/24/24  12           POC Start Date POC Expiration Date Signed POC?   7/2/24 8/13/24 pending   8/13/24 9/24/24 pending   9/24/24 11/5/24 pending     #[id number] Date 7/23 8/6 8/13 8/20 8/27 9/5 9/10 9/17 9/24   Visits/Units: 12 Used 4 5 6 7 8 9 10 11 12   Authed: 7/2/2024-10/2/2024  Remaining  8 7 6 5 4 3 2 1 0     #[id number] Date 10/1 10/8 10/14 10/22 10/29 11/19   Visits/Units: 12 Used 1 2 3 4 5 6   Authed: 9/27/24- 12/27/24 Remaining  11 10 9 8 7 6     Precautions: None  HEP: Access Code [email or web address]    Date 11/19 10/29 10/22 10/14 10/8 10/1 9/24   Visit Number 18 17 16 15 14 13 12   Manuals          T/s mobilizations       Gr. III-IV                                 Neuro Re-Ed           Scap retr   TRX rows w/eccentric lowering LUIS ANTONIO 3x10    TRX rows w/eccentric lowering LUIS ANTONIO 1x10 low, 1x10 high   Supine horiz abd          Supine GH abc          Seated no money          Supine diagonals          90/90 ER  Black 3x10 w/3s hold eccentrically to neutral    Black IR 3x10 w/3s hold eccentrically to neutral Black 4x10 B Black until fatigue +5      I, T, Y          90/90 supported on table ER & IR          Bosu planks          bodyblade  Flexion 2x30s    90/90 2x30s D2 flex/ext 3x10    Flexion 3x30s D2 flex/ext 3 sets to fatigue D2 flex/ext   3 sets to  fatigue - TCs for scap stab     Serratus wall slide  GTB serratus flexion 2x10 GTB serratus flexion 3x10 GTB serratus flexion 2x10 No wall YTB   4x5     Seated overhead ball taps    Green PB 3x30 Green PB  2x30     blazepod    Plank reach challenge 2x30s B      Single arm push-ups  BUE contact only using RUE to presses 1x10 @ table 1x10       Standing clocks          ER/IR walkouts          Ther Ex          Sleeper str          Doorway pec str          Cat-camel       2x10   T/s rot    2x10 B 2x10 B  2x10 B   T/s ext    Over bolster 2x10 Over bolster 1x15 +  15x PT ex mobs  In sitting w/hip hinge 1x15    Over bolster 1x15   Thread the needle     10x B 5s  1x15 B, 5s hold   Standing cane ext PROM          IR BTB str w/towel          Flys & reverse flys          Lat pull down          Inclined row          Reverse throws    Forward throws into IR 30x Half kneeling 30x  Half kneeling 3x10   OH throws green pball          OH carry          Ther Activity                              Gait Training                              Modalities

## 2024-11-19 NOTE — PROGRESS NOTES
Discharge     Today's date: 2024  Patient name: Rolf Heard  : 2000  MRN: [id number]  Referring provider: Shira Evans CR*  Dx:   Encounter Diagnosis     ICD-10-CM    1. Chronic right shoulder pain  M25.511     G89.29       2. Impingement syndrome of right shoulder  M75.41                      Subjective: Pt reports that her hasn't noticed any more significant changes and has plateaued for the last month or so. Pt continues with pain overhead when lifting weights as well as during bench press. He feels he has made progress but doesn't feel like he is progressing with PT anymore. Pt is curious about next steps.      Objective: See treatment diary below      Assessment:  Pt has not made any significant progress with his shoulder pain over the last several weeks and continues with difficulty during overhead activities and weight lifting. Pt's pain has decreased overall but is unable to be abolished lately. Due to pt's lack of progress recently and the level of his exercises, pt was advised to seek further evaluation and options from an orthopedic specialist while we put skilled PT on hold for now . Discussed with pt his options moving forward.      Plan:  Refer pt out to orthopedics for further evaluation. PT on hold for now.        Insurance:  AMA/CMS Eval/ Re-eval Auth #/ Referral # Total units or visits Start date  Expiration date KX? Visit limitation?  PT only or  PT+OT? Co-Insurance   CMS 24 [id number] 12 7/2/24 10/2/24       CMS 24           CMS 24  12           POC Start Date POC Expiration Date Signed POC?   24 pending   24 pending   24 pending     #[id number] Date 7/23 8/6 8/13 8/20 8/27 9/5 9/10 9/17 9/24   Visits/Units: 12 Used 4 5 6 7 8 9 10 11 12   Authed: 2024-10/2/2024  Remaining  8 7 6 5 4 3 2 1 0     #[id number] Date 10/1 10/8 10/14 10/22 10/29 11/19   Visits/Units: 12 Used 1 2 3 4 5 6   Authed: 24- 24 Remaining  11  10 9 8 7 6     Precautions: None  HEP: Access Code [email or web address]    Date 11/19 10/29 10/22 10/14 10/8 10/1 9/24   Visit Number 18 17 16 15 14 13 12   Manuals          T/s mobilizations       Gr. III-IV                                 Neuro Re-Ed           Scap retr   TRX rows w/eccentric lowering LUIS ANTONIO 3x10    TRX rows w/eccentric lowering LUIS ANTONIO 1x10 low, 1x10 high   Supine horiz abd          Supine GH abc          Seated no money          Supine diagonals          90/90 ER  Black 3x10 w/3s hold eccentrically to neutral    Black IR 3x10 w/3s hold eccentrically to neutral Black 4x10 B Black until fatigue +5      I, T, Y          90/90 supported on table ER & IR          Bosu planks          bodyblade  Flexion 2x30s    90/90 2x30s D2 flex/ext 3x10    Flexion 3x30s D2 flex/ext 3 sets to fatigue D2 flex/ext   3 sets to fatigue - TCs for scap stab     Serratus wall slide  GTB serratus flexion 2x10 GTB serratus flexion 3x10 GTB serratus flexion 2x10 No wall YTB   4x5     Seated overhead ball taps    Green PB 3x30 Green PB  2x30     blazepod    Plank reach challenge 2x30s B      Single arm push-ups  BUE contact only using RUE to presses 1x10 @ table 1x10       Standing clocks          ER/IR walkouts          Ther Ex          Sleeper str          Doorway pec str          Cat-camel       2x10   T/s rot    2x10 B 2x10 B  2x10 B   T/s ext    Over bolster 2x10 Over bolster 1x15 +  15x PT ex mobs  In sitting w/hip hinge 1x15    Over bolster 1x15   Thread the needle     10x B 5s  1x15 B, 5s hold   Standing cane ext PROM          IR BTB str w/towel          Flys & reverse flys          Lat pull down          Inclined row          Reverse throws    Forward throws into IR 30x Half kneeling 30x  Half kneeling 3x10   OH throws green pball          OH carry          Ther Activity                              Gait Training                              Modalities

## 2024-12-12 ENCOUNTER — EVALUATION (OUTPATIENT)
Dept: PHYSICAL THERAPY | Facility: CLINIC | Age: 24
End: 2024-12-12
Payer: COMMERCIAL

## 2024-12-12 DIAGNOSIS — G89.29 CHRONIC RIGHT SHOULDER PAIN: Primary | ICD-10-CM

## 2024-12-12 DIAGNOSIS — M25.511 CHRONIC RIGHT SHOULDER PAIN: Primary | ICD-10-CM

## 2024-12-12 DIAGNOSIS — M75.41 IMPINGEMENT SYNDROME OF RIGHT SHOULDER: ICD-10-CM

## 2024-12-12 PROCEDURE — 97164 PT RE-EVAL EST PLAN CARE: CPT

## 2024-12-12 PROCEDURE — 97112 NEUROMUSCULAR REEDUCATION: CPT

## 2024-12-12 NOTE — LETTER
2024    VINICIUS Jc  [address] [facility] [address]    Patient: Rolf Heard   YOB: 2000   Date of Visit: 2024     Encounter Diagnosis     ICD-10-CM    1. Chronic right shoulder pain  M25.511     G89.29       2. Impingement syndrome of right shoulder  M75.41           Dear Dr. Evans:    Thank you for your recent referral of Rolf Heard. Please review the attached evaluation summary from Rolf's recent visit.     Please verify that you agree with the plan of care by signing the attached order.     If you have any questions or concerns, please do not hesitate to call.     I sincerely appreciate the opportunity to share in the care of one of your patients and hope to have another opportunity to work with you in the near future.       Sincerely,    Lena Ruff, PT      Referring Provider:      I certify that I have read the below Plan of Care and certify the need for these services furnished under this plan of treatment while under my care.                    VINICIUS Jc  [address] [place] [id number]  Via Fax: [phone]          PT Re-Evaluation     Today's date: 2024  Patient name: Rolf Heard  : 2000  MRN: [id number]  Referring provider: Waylon Lazo MD  Dx:   Encounter Diagnosis     ICD-10-CM    1. Chronic right shoulder pain  M25.511     G89.29       2. Impingement syndrome of right shoulder  M75.41               Start Time: 1700  Stop Time: 1745  Total time in clinic (min): 45 minutes    Assessment  Impairments: abnormal or restricted ROM, abnormal movement, activity intolerance, pain with function, poor body mechanics, participation limitations and activity limitations  Symptom irritability: low    Assessment details: Pt is a 24 y.o male who presents to skilled physical therapy for his 12th visit with complaints of chronic R shoulder pain. Pt maintained his R shoulder A/PROM with pain going into IR, slightly  increased shoulder strength, decreased TTP, and continually improving scapular mechanics. Pt continues with pain when lifting weights/working out so his thoracic spine was investigated. Pt showed maximal restriction into thoracic extension with hypomobility and pain in his upper thoracic spine. After performing PA mobilizations and instructing pt on performing thoracic extensions in seated, pt's shoulder AROM was re-assessed which showed slightly improved motion and abolished pain with less tension noted. Pt's thoracic spine appears to be a contributing factor to his shoulder pain. Pt's remaining pain and deficits are preventing him from performing recreational activities without compensations. Pt was re-educated on his diagnosis, prognosis, POC, and HEP. Pt would continue to benefit from skilled physical therapy to reduce his pain, address his deficits, progress towards his goals, and return to his PLOF.    Understanding of Dx/Px/POC: good     Prognosis: good    Goals  Short Term Goals:  1) Pt will initiate and progress his HEP in 2-3 weeks.- Met  2) Pt will improve his shoulder AROM to WNL without pain or compensations in 2-3 weeks.- Met  3) Pt will improve his shoulder MMT by 1 to improve his ADLs in 2-3 weeks.- Met    Long Term Goals:  1) Pt will be able to reach overhead without pain to grab a plate in 4-6 weeks.- Met  2) Pt will be able to perform ADLs without shoulder pain in 4-6 weeks.- Progressing  3) Pt will be able to workout for at least 30 mins with less than 2/10 pain in 4-6 weeks. -Progressing    Plan  Patient would benefit from: skilled physical therapy and PT eval  Planned modality interventions: cryotherapy    Planned therapy interventions: activity modification, ADL retraining, joint mobilization, manual therapy, motor coordination training, body mechanics training, neuromuscular re-education, coordination, postural training, patient/caregiver education, strengthening, stretching, therapeutic  activities, therapeutic exercise, flexibility, functional ROM exercises, graded exercise and home exercise program    Frequency: 1x week  Duration in weeks: 6  Plan of Care beginning date: 2024  Plan of Care expiration date: 2024  Treatment plan discussed with: patient        Subjective Evaluation    History of Present Illness  Date of onset: 10/1/2023  Mechanism of injury: Pt has returned to OPPT after a 1 month absence secondary to self discharge and referral to Orthopedic. Pt followed up with orthopedic, where he was diagnosed with biceps tendonesis. Pt was given a injectionand prescribed meloxicam and tylenol for one week. Pt has completely rested upper body since injection. Pt reports symptoms have returned to baseline, if not worst. Pt reports symptoms remain in anterior R shoulder, and are provoked with cross body reach, R S/L, and OH reaching. Pt reports symptoms are relieved with change in position.  Patient Goals  Patient goals for therapy: decreased pain, increased motion, independence with ADLs/IADLs, return to sport/leisure activities and increased strength  Patient goal: working out without pain  Pain  Current pain ratin  At best pain ratin  At worst pain rating: 3  Location: anterior shoulder, biceps tendon/groove  Quality: sharp and burning  Relieving factors: rest (stretches, exercises)  Aggravating factors: overhead activity and lifting (chest press)  Progression: no change    Social Support  Lives with: alone    Employment status: working  Hand dominance: right  Exercise history: working out at the gym          Objective     Static Posture     Head  Forward.    Shoulders  Rounded.    Postural Observations  Seated posture: fair  Standing posture: fair      Tenderness     Right Shoulder  Tenderness in the biceps tendon (proximal). No tenderness in the AC joint, acromion and bicipital groove.     Active Range of Motion   Cervical/Thoracic Spine       Thoracic    Flexion:   WFL  Extension: Active thoracic extension: tight.     Restriction level: maximal  Left Shoulder   Flexion: 165 (pulling) degrees   Abduction: 175 degrees   External rotation BTH: T4   Internal rotation BTB: T5     Right Shoulder   Flexion: 165 (pulling) degrees   Abduction: 170 degrees   External rotation 90°: 100 degrees  External rotation BTH: T4 (pinch)   Internal rotation 90°: 80 degrees with pain  Internal rotation BTB: T6     Passive Range of Motion     Right Shoulder   Flexion: WFL  Abduction: WFL  External rotation 90°: WFL    Joint Play     Hypomobile: T1, T2, T3, T4, T5, T6, T7 and T8     Pain: T1, T2 and T3     Comments: After performing several PA mobilizations around T1-T4, retested pt's shoulder AROM with decreased pain/sensation of tightness    Strength/Myotome Testing     Left Shoulder     Planes of Motion   Flexion: 5   Abduction: 4+   External rotation at 0°: 5   Internal rotation at 0°: 4+     Right Shoulder     Planes of Motion   Flexion: 5   Abduction: 5   External rotation at 0°: 5   External rotation at 90°: 4+   Internal rotation at 0°: 5   Internal rotation at 90°: 4+     Left Elbow   Flexion: 5  Extension: 5    Right Elbow   Flexion: 5  Extension: 5    Tests     Right Shoulder   Positive Hawkin's.   Negative empty can, full can, horn blower, lift-off, painful arc and bicep load test positive.                   Insurance:  AMA/CMS Eval/ Re-eval Auth #/ Referral # Total units or visits Start date  Expiration date KX? Visit limitation?  PT only or  PT+OT? Co-Insurance   CMS 7/2/24 [id number] 12 7/2/24 10/2/24       CMS 8/13/24           CMS 9/24/24           CMS 12/12             POC Start Date POC Expiration Date Signed POC?   7/2/24 8/13/24 pending   8/13/24 9/24/24 pending   9/24/24 11/5/24 pending   12/12 2/12      #[id number] Date 12/12            Visits/Units: 12 Used 1            Authed: 9/27/24-12/27/24 Remaining  4              Precautions: None  HEP: Access Code [email or web address]    Date      "RE 12/12   Visit Number     8   Manuals                                        Neuro Re-Ed         Scap retr     Prone 10x5\"   Supine horiz abd        Supine GH abc        Seated no money        Supine diagonals        90/90 ER        I, T, Y        90/90 supported on table ER & IR        Bosu planks        bodyblade        Serratus wall slide        Wall walks        Standing clocks        ER/IR walkouts        Ther Ex        Sleeper str        Doorway pec str        T/S Extension     10x chair w/ airex   Standing cane ext PROM        IR BTB str w/towel        Flys & reverse flys        Lat pull down        Inclined row        Foam roll T/S     20x   Reverse throws        UT stretch     3x30\"   OH throws green pball        LS stretch     3x30\"   Open books     10x10\"   OH carry        Ther Activity                        Gait Training                        Modalities                                                             "

## 2024-12-12 NOTE — PROGRESS NOTES
PT Re-Evaluation     Today's date: 2024  Patient name: Rolf Heard  : 2000  MRN: [id number]  Referring provider: Waylon Lazo MD  Dx:   Encounter Diagnosis     ICD-10-CM    1. Chronic right shoulder pain  M25.511     G89.29       2. Impingement syndrome of right shoulder  M75.41               Start Time: 1700  Stop Time: 1745  Total time in clinic (min): 45 minutes    Assessment  Impairments: abnormal or restricted ROM, abnormal movement, activity intolerance, pain with function, poor body mechanics, participation limitations and activity limitations  Symptom irritability: low    Assessment details: Pt is a 24 y.o male who presents to skilled physical therapy for his 12th visit with complaints of chronic R shoulder pain. Pt maintained his R shoulder A/PROM with pain going into IR, slightly increased shoulder strength, decreased TTP, and continually improving scapular mechanics. Pt continues with pain when lifting weights/working out so his thoracic spine was investigated. Pt showed maximal restriction into thoracic extension with hypomobility and pain in his upper thoracic spine. After performing PA mobilizations and instructing pt on performing thoracic extensions in seated, pt's shoulder AROM was re-assessed which showed slightly improved motion and abolished pain with less tension noted. Pt's thoracic spine appears to be a contributing factor to his shoulder pain. Pt's remaining pain and deficits are preventing him from performing recreational activities without compensations. Pt was re-educated on his diagnosis, prognosis, POC, and HEP. Pt would continue to benefit from skilled physical therapy to reduce his pain, address his deficits, progress towards his goals, and return to his PLOF.    Understanding of Dx/Px/POC: good     Prognosis: good    Goals  Short Term Goals:  1) Pt will initiate and progress his HEP in 2-3 weeks.- Met  2) Pt will improve his shoulder AROM to WNL without pain or  compensations in 2-3 weeks.- Met  3) Pt will improve his shoulder MMT by 1 to improve his ADLs in 2-3 weeks.- Met    Long Term Goals:  1) Pt will be able to reach overhead without pain to grab a plate in 4-6 weeks.- Met  2) Pt will be able to perform ADLs without shoulder pain in 4-6 weeks.- Progressing  3) Pt will be able to workout for at least 30 mins with less than 2/10 pain in 4-6 weeks. -Progressing    Plan  Patient would benefit from: skilled physical therapy and PT eval  Planned modality interventions: cryotherapy    Planned therapy interventions: activity modification, ADL retraining, joint mobilization, manual therapy, motor coordination training, body mechanics training, neuromuscular re-education, coordination, postural training, patient/caregiver education, strengthening, stretching, therapeutic activities, therapeutic exercise, flexibility, functional ROM exercises, graded exercise and home exercise program    Frequency: 1x week  Duration in weeks: 6  Plan of Care beginning date: 9/24/2024  Plan of Care expiration date: 11/5/2024  Treatment plan discussed with: patient        Subjective Evaluation    History of Present Illness  Date of onset: 10/1/2023  Mechanism of injury: Pt has returned to OPPT after a 1 month absence secondary to self discharge and referral to Orthopedic. Pt followed up with orthopedic, where he was diagnosed with biceps tendonesis. Pt was given a injectionand prescribed meloxicam and tylenol for one week. Pt has completely rested upper body since injection. Pt reports symptoms have returned to baseline, if not worst. Pt reports symptoms remain in anterior R shoulder, and are provoked with cross body reach, R S/L, and OH reaching. Pt reports symptoms are relieved with change in position.  Patient Goals  Patient goals for therapy: decreased pain, increased motion, independence with ADLs/IADLs, return to sport/leisure activities and increased strength  Patient goal: working out  without pain  Pain  Current pain ratin  At best pain ratin  At worst pain rating: 3  Location: anterior shoulder, biceps tendon/groove  Quality: sharp and burning  Relieving factors: rest (stretches, exercises)  Aggravating factors: overhead activity and lifting (chest press)  Progression: no change    Social Support  Lives with: alone    Employment status: working  Hand dominance: right  Exercise history: working out at the gym          Objective     Static Posture     Head  Forward.    Shoulders  Rounded.    Postural Observations  Seated posture: fair  Standing posture: fair      Tenderness     Right Shoulder  Tenderness in the biceps tendon (proximal). No tenderness in the AC joint, acromion and bicipital groove.     Active Range of Motion   Cervical/Thoracic Spine       Thoracic    Flexion:  WFL  Extension: Active thoracic extension: tight.     Restriction level: maximal  Left Shoulder   Flexion: 165 (pulling) degrees   Abduction: 175 degrees   External rotation BTH: T4   Internal rotation BTB: T5     Right Shoulder   Flexion: 165 (pulling) degrees   Abduction: 170 degrees   External rotation 90°: 100 degrees  External rotation BTH: T4 (pinch)   Internal rotation 90°: 80 degrees with pain  Internal rotation BTB: T6     Passive Range of Motion     Right Shoulder   Flexion: WFL  Abduction: WFL  External rotation 90°: WFL    Joint Play     Hypomobile: T1, T2, T3, T4, T5, T6, T7 and T8     Pain: T1, T2 and T3     Comments: After performing several PA mobilizations around T1-T4, retested pt's shoulder AROM with decreased pain/sensation of tightness    Strength/Myotome Testing     Left Shoulder     Planes of Motion   Flexion: 5   Abduction: 4+   External rotation at 0°: 5   Internal rotation at 0°: 4+     Right Shoulder     Planes of Motion   Flexion: 5   Abduction: 5   External rotation at 0°: 5   External rotation at 90°: 4+   Internal rotation at 0°: 5   Internal rotation at 90°: 4+     Left Elbow  "  Flexion: 5  Extension: 5    Right Elbow   Flexion: 5  Extension: 5    Tests     Right Shoulder   Positive Hawkin's.   Negative empty can, full can, horn blower, lift-off, painful arc and bicep load test positive.                   Insurance:  AMA/CMS Eval/ Re-eval Auth #/ Referral # Total units or visits Start date  Expiration date KX? Visit limitation?  PT only or  PT+OT? Co-Insurance   CMS 7/2/24 [id number]  7/2/24 10/2/24       CMS 8/13/24           CMS 9/24/24           CMS 12/12             POC Start Date POC Expiration Date Signed POC?   7/2/24 8/13/24 pending   8/13/24 9/24/24 pending   9/24/24 11/5/24 pending   12/12 2/12      #[id number] Date 12/12            Visits/Units: 12 Used 1            Authed: 9/27/24-12/27/24 Remaining  4              Precautions: None  HEP: Access Code [email or web address]    Date     RE 12/12   Visit Number     8   Manuals                                        Neuro Re-Ed         Scap retr     Prone 10x5\"   Supine horiz abd        Supine GH abc        Seated no money        Supine diagonals        90/90 ER        I, T, Y        90/90 supported on table ER & IR        Bosu planks        bodyblade        Serratus wall slide        Wall walks        Standing clocks        ER/IR walkouts        Ther Ex        Sleeper str        Doorway pec str        T/S Extension     10x chair w/ airex   Standing cane ext PROM        IR BTB str w/towel        Flys & reverse flys        Lat pull down        Inclined row        Foam roll T/S     20x   Reverse throws        UT stretch     3x30\"   OH throws green pball        LS stretch     3x30\"   Open books     10x10\"   OH carry        Ther Activity                        Gait Training                        Modalities                                             "

## 2024-12-19 ENCOUNTER — OFFICE VISIT (OUTPATIENT)
Dept: PHYSICAL THERAPY | Facility: CLINIC | Age: 24
End: 2024-12-19
Payer: COMMERCIAL

## 2024-12-19 DIAGNOSIS — G89.29 CHRONIC RIGHT SHOULDER PAIN: Primary | ICD-10-CM

## 2024-12-19 DIAGNOSIS — M25.511 CHRONIC RIGHT SHOULDER PAIN: Primary | ICD-10-CM

## 2024-12-19 DIAGNOSIS — M75.41 IMPINGEMENT SYNDROME OF RIGHT SHOULDER: ICD-10-CM

## 2024-12-19 PROCEDURE — 97112 NEUROMUSCULAR REEDUCATION: CPT

## 2024-12-19 PROCEDURE — 97110 THERAPEUTIC EXERCISES: CPT

## 2024-12-19 NOTE — PROGRESS NOTES
"Daily Note     Today's date: 2024  Patient name: Rolf Heard  : 2000  MRN: [id number]  Referring provider: Waylon Lazo MD  Dx:   Encounter Diagnosis     ICD-10-CM    1. Chronic right shoulder pain  M25.511     G89.29       2. Impingement syndrome of right shoulder  M75.41           Start Time: 1700  Stop Time: 1745  Total time in clinic (min): 45 minutes    Subjective: Pt reports symptoms remain about the same, on Tuesday he branch pressed and performed push ups with increased pain into R ant shoulder.       Objective: See treatment diary below      Assessment: Pt educated on mechanics of bench press, also to add towel roll to chest to avoid full ROM. Pt also educated on eccentric loading pecs. Added Shoulder ER, I/T/Y, and cont c/s stretch & T/S extension.     Plan: Continue per plan of care.  Progress treatment as tolerated.          Insurance:  A/CMS Eval/ Re-eval Auth #/ Referral # Total units or visits Start date  Expiration date KX? Visit limitation?  PT only or  PT+OT? Co-Insurance   CMS 24 [id number]  7/2/24 10/2/24       CMS 24           CMS 24           CMS              POC Start Date POC Expiration Date Signed POC?   24 pending   24 pending   24 pending         #[id number] Date            Visits/Units: 12 Used 1 1           Authed: 24-24 Remaining  4 4             Precautions: None  HEP: Access Code [email or web address]    Date     RE    Visit Number    9 8   Manuals        PA T/S    Grade 2-3                             Neuro Re-Ed         Scap retr    20x5\" Prone 10x5\"   Supine horiz abd        Supine GH abc        Seated no money        Supine diagonals        90/90 ER        I, T, Y    Prone ball 10x3\"    90/90 supported on table ER & IR        Bosu planks        bodyblade        Serratus wall slide        Wall walks        Standing clocks        ER/IR walkouts        Ther Ex        Sleeper str  " "      Doorway pec str        T/S Extension    10x chair w/ airex 10x chair w/ airex   Standing cane ext PROM        ER shoulder w/ towel    2x10 BTB    Flys & reverse flys        Lat pull down        Inclined row        Foam roll T/S     20x   Reverse throws        UT stretch    3x30\" 3x30\"   OH throws green pball        LS stretch    3x30\" 3x30\"   Open books    10x10\" 10x10\"   OH carry        Ther Activity                        Gait Training                        Modalities                                             "

## 2024-12-27 ENCOUNTER — APPOINTMENT (OUTPATIENT)
Dept: PHYSICAL THERAPY | Facility: CLINIC | Age: 24
End: 2024-12-27
Payer: COMMERCIAL

## 2025-01-07 ENCOUNTER — OFFICE VISIT (OUTPATIENT)
Dept: PHYSICAL THERAPY | Facility: CLINIC | Age: 25
End: 2025-01-07
Payer: COMMERCIAL

## 2025-01-07 DIAGNOSIS — M75.41 IMPINGEMENT SYNDROME OF RIGHT SHOULDER: ICD-10-CM

## 2025-01-07 DIAGNOSIS — M25.511 CHRONIC RIGHT SHOULDER PAIN: Primary | ICD-10-CM

## 2025-01-07 DIAGNOSIS — G89.29 CHRONIC RIGHT SHOULDER PAIN: Primary | ICD-10-CM

## 2025-01-07 PROCEDURE — 97110 THERAPEUTIC EXERCISES: CPT

## 2025-01-07 PROCEDURE — 97112 NEUROMUSCULAR REEDUCATION: CPT

## 2025-01-07 NOTE — PROGRESS NOTES
Daily Note     Today's date: 2025  Patient name: Rolf Heard  : 2000  MRN: [id number]  Referring provider: Shira Evans CR*  Dx:   Encounter Diagnosis     ICD-10-CM    1. Chronic right shoulder pain  M25.511     G89.29       2. Impingement syndrome of right shoulder  M75.41           Start Time: 1751  Stop Time: 1830  Total time in clinic (min): 39 minutes    Subjective: Pt reports no significant changes with his shoulder, he has been resting it from the gym completely to let it calm down. Pt felt relief initially from his injection previously but his pain came back after a few days. Pt is going back to his ortho tomorrow to go over his options with his shoulder.      Objective: See treatment diary below      Assessment: Tolerated treatment fair. Patient demonstrated fatigue post treatment, exhibited good technique with therapeutic exercises, and would benefit from continued PT. Discussed with pt his possible diagnoses, orthopedic treatment options, and possible next steps after his next orthopedic visit. Continued to focus on RTC strengthening, scapular mechanics, and t/s & shoulder mobility.       Plan: Continue per plan of care.  Progress treatment as tolerated.          Insurance:  AMA/CMS Eval/ Re-eval Auth #/ Referral # Total units or visits Start date  Expiration date KX? Visit limitation?  PT only or  PT+OT? Co-Insurance   CMS 24 [id number] 12 7/2/24 10/2/24       CMS 24           CMS 24           CMS              POC Start Date POC Expiration Date Signed POC?   24 pending   24 pending   24 pending   24 pending     #[id number] Date            Visits/Units: 12 Used 1 1           Authed: 24-24 Remaining  4 4              Date 25    Visits/Units: 12 Used 1    Authed: 24-3/30/25 Remaining  11      Precautions: None  HEP: Access Code [email or web address]    Date   25 RE    Visit Number   10  "9 8   Manuals        PA T/S    Grade 2-3                             Neuro Re-Ed         Scap retr    20x5\" Prone 10x5\"   Supine horiz abd        Supine GH abc        Seated no money        Supine diagonals        90/90 ER        I, T, Y   Prone ball 15x3\" ea Prone ball 10x3\"    90/90 supported on table ER & IR        Bosu planks        bodyblade        Serratus wall slide        Wall walks        Standing clocks   Sharyn 4 way pull 2.5-4.0 2x10 ea     ER/IR walkouts        Ther Ex        Sleeper str        Doorway pec str        T/S Extension   x10 chair w/airex 10x chair w/ airex 10x chair w/ airex   Standing cane ext PROM        ER shoulder w/ towel    2x10 BTB    Flys & reverse flys        Lat pull down        Inclined row        Foam roll T/S     20x   Reverse throws        UT stretch    3x30\" 3x30\"   OH throws green pball        LS stretch    3x30\" 3x30\"   Open books   10x10\" 10x10\" 10x10\"   OH carry        Ther Activity                        Gait Training                        Modalities                                               "